# Patient Record
(demographics unavailable — no encounter records)

---

## 2017-07-13 NOTE — XRAY REPORT
ROUTINE CHEST, TWO VIEWS:



HISTORY:  Shortness of breath, COPD.



The trachea, heart, mediastinal contour, lung fields and bony thorax 

are unremarkable.



IMPRESSION:

Unremarkable chest x-ray.

## 2018-01-15 NOTE — EMERGENCY DEPARTMENT REPORT
ED General Adult HPI





- General


Chief complaint: Dyspnea/Respdistress


Stated complaint: MARYJANE


Time Seen by Provider: 01/15/18 07:05


Source: patient


Mode of arrival: Stretcher


Limitations: No Limitations





- History of Present Illness


Initial comments: 





This is a patient with COPD and exacerbation of the last day.  She states he's 

had a scant amount of yellow sputum and feels as if she had a cold.  She denies 

chest pain.  She denies leg swelling.  She's had no fever or chills.  She 

denies abdominal pain.  She's had no recent travel.  She denies any history of 

cardiovascular disease.





The patient was given magnesium albuterol nebs and Solu-Medrol in the field.  

She arrives in the emergency department with persistent wheezing but is not in 

respiratory distress.


-: Gradual, hour(s)


Severity scale (0 -10): 0


Associated Symptoms: denies other symptoms, shortness of breath (wheezing)


Treatments Prior to Arrival: none





- Related Data


 Home Medications











 Medication  Instructions  Recorded  Confirmed  Last Taken


 


ALBUTEROL Inhaler 2 puff INHALATION BID 10/25/17 10/26/17 10/25/17


 


Advair 100-50 Diskus 2 puff INHALATION BID 10/25/17 10/26/17 10/26/17


 


Lisinopril 20 mg PO DAILY 10/25/17 10/26/17 10/25/17


 


Montelukast 10 mg PO DAILY 10/25/17 10/26/17 10/25/17


 


amLODIPine 5 mg PO DAILY 10/25/17 10/26/17 10/25/17








 Previous Rx's











 Medication  Instructions  Recorded  Last Taken  Type


 


predniSONE [Deltasone] 50 mg PO QDAY #5 tab 04/19/17 Unknown Rx


 


ALBUTEROL Inhaler [ProAir HFA 2 puff IH QID PRN #1 inhalation 01/15/18 Unknown 

Rx





Inhaler]    


 


ALBUTEROL NEB's [Proventil 0.083% 2.5 mg IH TID PRN #90 neb 01/15/18 Unknown Rx





NEBS]    


 


Mometasone Furoate [Asmanex] 1 inhalation IH QPM #1 inhalation 01/15/18 Unknown 

Rx


 


predniSONE [Deltasone] 60 mg PO QDAY #20 tab 01/15/18 Unknown Rx











 Allergies











Allergy/AdvReac Type Severity Reaction Status Date / Time


 


No Known Allergies Allergy   Verified 11/07/16 09:43














ED Review of Systems


ROS: 


Stated complaint: MARYJANE


Other details as noted in HPI





Constitutional: denies: chills, fever


Eyes: denies: eye pain, eye discharge, vision change


ENT: denies: ear pain, throat pain


Respiratory: cough, shortness of breath, wheezing


Cardiovascular: denies: chest pain, palpitations


Endocrine: no symptoms reported


Gastrointestinal: denies: abdominal pain, nausea, diarrhea


Genitourinary: denies: urgency, dysuria, discharge


Musculoskeletal: denies: back pain, joint swelling, arthralgia


Skin: denies: rash, lesions


Neurological: denies: headache, weakness, paresthesias


Psychiatric: denies: anxiety, depression


Hematological/Lymphatic: denies: easy bleeding, easy bruising





ED Past Medical Hx





- Past Medical History


Previous Medical History?: Yes


Hx Hypertension: Yes


Hx Asthma: Yes (occasional steroid use)


Hx COPD: Yes





- Surgical History


Past Surgical History?: No





- Social History


Smoking Status: Never Smoker


Substance Use Type: None





- Medications


Home Medications: 


 Home Medications











 Medication  Instructions  Recorded  Confirmed  Last Taken  Type


 


predniSONE [Deltasone] 50 mg PO QDAY #5 tab 04/19/17 10/26/17 Unknown Rx


 


ALBUTEROL Inhaler 2 puff INHALATION BID 10/25/17 10/26/17 10/25/17 History


 


Advair 100-50 Diskus 2 puff INHALATION BID 10/25/17 10/26/17 10/26/17 History


 


Lisinopril 20 mg PO DAILY 10/25/17 10/26/17 10/25/17 History


 


Montelukast 10 mg PO DAILY 10/25/17 10/26/17 10/25/17 History


 


amLODIPine 5 mg PO DAILY 10/25/17 10/26/17 10/25/17 History


 


ALBUTEROL Inhaler [ProAir HFA 2 puff IH QID PRN #1 inhalation 01/15/18  Unknown 

Rx





Inhaler]     


 


ALBUTEROL NEB's [Proventil 0.083% 2.5 mg IH TID PRN #90 neb 01/15/18  Unknown Rx





NEBS]     


 


Mometasone Furoate [Asmanex] 1 inhalation IH QPM #1 inhalation 01/15/18  

Unknown Rx


 


predniSONE [Deltasone] 60 mg PO QDAY #20 tab 01/15/18  Unknown Rx














ED Physical Exam





- General


Limitations: No Limitations


General appearance: alert, in no apparent distress





- Head


Head exam: Present: atraumatic, normocephalic





- Eye


Eye exam: Present: normal appearance.  Absent: scleral icterus





- ENT


ENT exam: Present: mucous membranes moist





- Neck


Neck exam: Present: normal inspection





- Respiratory


Respiratory exam: Present: wheezes.  Absent: normal lung sounds bilaterally, 

respiratory distress, accessory muscle use





- Cardiovascular


Cardiovascular Exam: Present: regular rate, normal rhythm.  Absent: systolic 

murmur, diastolic murmur, rubs, gallop





- GI/Abdominal


GI/Abdominal exam: Present: soft, normal bowel sounds.  Absent: distended, 

tenderness, guarding, rebound, rigid





- Extremities Exam


Extremities exam: Present: normal inspection





- Back Exam


Back exam: Present: normal inspection





- Neurological Exam


Neurological exam: Present: alert, oriented X3, CN II-XII intact.  Absent: 

motor sensory deficit





- Psychiatric


Psychiatric exam: Present: normal affect, normal mood





- Skin


Skin exam: Present: warm, dry, intact, normal color.  Absent: rash





ED Course


 Vital Signs











  01/15/18 01/15/18 01/15/18





  06:52 07:37 07:43


 


Temperature 97.7 F  


 


Pulse Rate 106 H  


 


Pulse Rate [   78





Anterior   





Bilateral   





Throughout]   


 


Respiratory 22  





Rate   


 


Respiratory   20





Rate [Anterior   





Bilateral   





Throughout]   


 


Blood Pressure 170/96  


 


Blood Pressure 170/96  





[Left]   


 


O2 Sat by Pulse 100 100 





Oximetry   














  01/15/18





  08:19


 


Temperature 


 


Pulse Rate 


 


Pulse Rate [ 95 H





Anterior 





Bilateral 





Throughout] 


 


Respiratory 





Rate 


 


Respiratory 20





Rate [Anterior 





Bilateral 





Throughout] 


 


Blood Pressure 


 


Blood Pressure 





[Left] 


 


O2 Sat by Pulse 





Oximetry 














- Reevaluation(s)


Reevaluation #1: 


Wheezing improved.  Patient states that she feels well enough to go home.  

Awaiting lab tests.


01/15/18 08:33








ED Medical Decision Making





- Lab Data


Result diagrams: 


 01/15/18 07:22





 01/15/18 07:22








 Laboratory Results - last 24 hr











  01/15/18 01/15/18





  07:22 07:22


 


WBC  8.5 


 


RBC  4.38 


 


Hgb  13.1 


 


Hct  40.8 


 


MCV  93 


 


MCH  30 


 


MCHC  32 


 


RDW  13.5 


 


Plt Count  252 


 


Lymph % (Auto)  36.8 H 


 


Mono % (Auto)  7.4 H 


 


Eos % (Auto)  5.7 H 


 


Baso % (Auto)  0.7 


 


Lymph #  3.1 


 


Mono #  0.6 


 


Eos #  0.5 H 


 


Baso #  0.1 


 


Seg Neutrophils %  49.4 


 


Seg Neutrophils #  4.2 


 


Sodium   142


 


Potassium   3.0 L


 


Chloride   101.3


 


Carbon Dioxide   25


 


Anion Gap   19


 


BUN   14


 


Creatinine   0.6 L


 


Estimated GFR   > 60


 


BUN/Creatinine Ratio   23


 


Glucose   130 H


 


Calcium   8.8














 Laboratory Results - last 24 hr











  01/15/18 01/15/18





  07:22 07:22


 


WBC  8.5 


 


RBC  4.38 


 


Hgb  13.1 


 


Hct  40.8 


 


MCV  93 


 


MCH  30 


 


MCHC  32 


 


RDW  13.5 


 


Plt Count  252 


 


Lymph % (Auto)  36.8 H 


 


Mono % (Auto)  7.4 H 


 


Eos % (Auto)  5.7 H 


 


Baso % (Auto)  0.7 


 


Lymph #  3.1 


 


Mono #  0.6 


 


Eos #  0.5 H 


 


Baso #  0.1 


 


Seg Neutrophils %  49.4 


 


Seg Neutrophils #  4.2 


 


Sodium   142


 


Potassium   3.0 L


 


Chloride   101.3


 


Carbon Dioxide   25


 


Anion Gap   19


 


BUN   14


 


Creatinine   0.6 L


 


Estimated GFR   > 60


 


BUN/Creatinine Ratio   23


 


Glucose   130 H


 


Calcium   8.8














 Laboratory Results - last 24 hr











  01/15/18 01/15/18





  07:22 07:22


 


WBC  8.5 


 


RBC  4.38 


 


Hgb  13.1 


 


Hct  40.8 


 


MCV  93 


 


MCH  30 


 


MCHC  32 


 


RDW  13.5 


 


Plt Count  252 


 


Lymph % (Auto)  36.8 H 


 


Mono % (Auto)  7.4 H 


 


Eos % (Auto)  5.7 H 


 


Baso % (Auto)  0.7 


 


Lymph #  3.1 


 


Mono #  0.6 


 


Eos #  0.5 H 


 


Baso #  0.1 


 


Seg Neutrophils %  49.4 


 


Seg Neutrophils #  4.2 


 


Sodium   142


 


Potassium   3.0 L


 


Chloride   101.3


 


Carbon Dioxide   25


 


Anion Gap   19


 


BUN   14


 


Creatinine   0.6 L


 


Estimated GFR   > 60


 


BUN/Creatinine Ratio   23


 


Glucose   130 H


 


Calcium   8.8














 Laboratory Results - last 24 hr











  01/15/18 01/15/18 01/15/18





  07:22 07:22 09:40


 


WBC  8.5  


 


RBC  4.38  


 


Hgb  13.1  


 


Hct  40.8  


 


MCV  93  


 


MCH  30  


 


MCHC  32  


 


RDW  13.5  


 


Plt Count  252  


 


Lymph % (Auto)  36.8 H  


 


Mono % (Auto)  7.4 H  


 


Eos % (Auto)  5.7 H  


 


Baso % (Auto)  0.7  


 


Lymph #  3.1  


 


Mono #  0.6  


 


Eos #  0.5 H  


 


Baso #  0.1  


 


Seg Neutrophils %  49.4  


 


Seg Neutrophils #  4.2  


 


PT    13.5


 


INR    0.98


 


APTT    29.2


 


Sodium   142 


 


Potassium   3.0 L 


 


Chloride   101.3 


 


Carbon Dioxide   25 


 


Anion Gap   19 


 


BUN   14 


 


Creatinine   0.6 L 


 


Estimated GFR   > 60 


 


BUN/Creatinine Ratio   23 


 


Glucose   130 H 


 


Calcium   8.8 


 


Total Bilirubin   


 


Direct Bilirubin   


 


AST   


 


ALT   


 


Alkaline Phosphatase   


 


Troponin T   


 


NT-Pro-B Natriuret Pep   


 


Total Protein   


 


Albumin   


 


Albumin/Globulin Ratio   














  01/15/18





  09:40


 


WBC 


 


RBC 


 


Hgb 


 


Hct 


 


MCV 


 


MCH 


 


MCHC 


 


RDW 


 


Plt Count 


 


Lymph % (Auto) 


 


Mono % (Auto) 


 


Eos % (Auto) 


 


Baso % (Auto) 


 


Lymph # 


 


Mono # 


 


Eos # 


 


Baso # 


 


Seg Neutrophils % 


 


Seg Neutrophils # 


 


PT 


 


INR 


 


APTT 


 


Sodium 


 


Potassium 


 


Chloride 


 


Carbon Dioxide 


 


Anion Gap 


 


BUN 


 


Creatinine 


 


Estimated GFR 


 


BUN/Creatinine Ratio 


 


Glucose 


 


Calcium 


 


Total Bilirubin  0.40


 


Direct Bilirubin  < 0.2


 


AST  19


 


ALT  15


 


Alkaline Phosphatase  93


 


Troponin T  < 0.010


 


NT-Pro-B Natriuret Pep  42.30


 


Total Protein  7.6


 


Albumin  4.2


 


Albumin/Globulin Ratio  1.2














- EKG Data


-: EKG Interpreted by Me


EKG shows normal: sinus rhythm, intervals, QRS complexes, ST-T waves





- EKG Data


Interpretation: other (somewhat low voltage left axis deviation no ischemic 

changes)





- Radiology Data


Radiology results: report reviewed (chest x-ray shows no acute findings)


Critical care attestation.: 


If time is entered above; I have spent that time in minutes in the direct care 

of this critically ill patient, excluding procedure time.








ED Disposition


Clinical Impression: 


 COPD exacerbation, Hypokalemia





Disposition: DC-01 TO HOME OR SELFCARE


Is pt being admited?: No


Does the pt Need Aspirin: No


Condition: Stable


Instructions:  Chronic Obstructive Pulmonary Disease (ED), Hypokalemia (ED)


Additional Instructions: 


Follow-up with your primary care provider.  Rx prednisone, inhaler and 

albuterol for a urine machine as needed.  I'll also prescribed an inhaled 

steroid.  I'm giving name of a primary care provider and a lung specialist Dr. Shaikh.


Prescriptions: 


ALBUTEROL Inhaler [ProAir HFA Inhaler] 2 puff IH QID PRN #1 inhalation


 PRN Reason: Shortness Of Breath


ALBUTEROL NEB's [Proventil 0.083% NEBS] 2.5 mg IH TID PRN #90 neb


 PRN Reason: Wheezing


Mometasone Furoate [Asmanex] 1 inhalation IH QPM #1 inhalation


predniSONE [Deltasone] 60 mg PO QDAY #20 tab


Referrals: 


MINO BECKETT MD [Primary Care Provider] - 3-5 Days


DIANNE SHAIKH MD [Staff Physician] - 3-5 Days


Time of Disposition: 10:37

## 2018-01-15 NOTE — XRAY REPORT
FINAL REPORT



EXAM:  XR CHEST 1V AP



HISTORY:  Dyspnea 



TECHNIQUE:  AP portable view(s) of the chest obtained.



PRIORS:  None.



FINDINGS:  

No mediastinal shift. Cardiac silhouette is not enlarged.

Hyperaeration of the lungs and flattening of the hemidiaphragms.

No pneumothorax, effusion, or focal pulmonary opacity identified.

No acute skeletal findings. 



IMPRESSION:  

No acute pulmonary finding identified. 



Suggested sequela of COPD.

## 2018-03-11 NOTE — CAT SCAN REPORT
FINAL REPORT



PROCEDURE:  CT ANGIO CHEST



TECHNIQUE:  Computerized tomographic angiography of the chest was

performed after the IV injection of iodinated nonionic contrast

including image processing. The image data was postprocessed

using 2-dimensional multiplanar reformatted (MPR) and

3-dimensional (MIP and/or volume rendered) techniques. 



HISTORY:  decr pulses dispite strong US cardiac act 



COMPARISON:  No prior studies are available for comparison.



FINDINGS:  

Pulmonary outflow tract, right and left main pulmonary arteries

and their proximal branches: Clear, no filling defects seen to

suggest pulmonary embolus.



Pericardium: No evidence of pericardial effusion.



Thoracic aorta: No evidence of aneurysmal dilatation or

dissection.



Coronary arteries: Are unremarkable.



Mediastinum and hilar regions: Nonspecific subcentimeter lymph

nodes are visualized. No pathologically enlarged lymph nodes or

masses are identified. 



Lung Fields: There is dense consolidation in the right upper lobe

and also right middle lobe. Right upper lobe bronchus appears

occluded. This could represent mucous plugs or mass. There is

mild patchy peripheral density in the right lower lobe probably

representing atelectasis. There is minimal atelectasis on the

left. There is no pneumothorax or pleural effusion identified. 



Upper abdomen: No acute or focal abnormality is seeen.



Other: Endotracheal tube in place. The tip lies 2.4 centimeter

above the britt. Left jugular central venous line in place. The

tip projects in the mid innominate vein. 



IMPRESSION:  

No evidence of pulmonary embolus.



Dense consolidation right upper lobe right middle lobe and

obstruction of the bronchus may be due to mucous plugging or

aspiration. I cannot exclude a mass.



Small amount of atelectasis appears to be present in the right

lower lobe and left lung. No effusions or pneumothorax are

visualized. 



Endotracheal tube and left subclavian central venous line as

described. 



There is a nondisplaced fracture through the left 3rd and 4th

ribs anterior lateral there is a minimally displaced fracture

through the left 5th rib anterior lateral. Nondisplaced fracture

seen through the anterior lateral aspect right 2nd, right 4th rib

## 2018-03-11 NOTE — CAT SCAN REPORT
FINAL REPORT



PROCEDURE:  CT ANGIO ABDOMEN PELVIS



TECHNIQUE:  Computerized axial tomographic angiography of the

abdomen and pelvis was performed after the IV injection of

iodinated nonionic contrast. The image data was postprocessed

using 2-dimensional multiplanar reformatted (MPR) and

3-dimensional (MIP and/or volume rendered) techniques.



HISTORY:  decr pulses dispite strong US cardiac act 



COMPARISON:  No prior studies are available for comparison.



FINDINGS:  

Lower Lung fields: Dense consolidation visualized in the right

middle lobe. There is some patchy atelectasis visualized in the

right and left lower lobes.



Upper Abdomen: Gallbladder wall appears irregularly thickened in

there is some fluid collected in the gallbladder fossa. I cannot

exclude acute cholecystitis. Liver density appears mildly

decreased suggesting fatty infiltration. No discrete liver

lesions are identified. Pancreas showed no focal abnormality. The

spleen is not enlarged. No adrenal abnormalities are seen. Small

amount of fluid is collected adjacent to the inferior medial

aspect of the right lobe of the liver and adjacent to the

inferior vena cava.



Kidneys, Ureters and Urinary bladder: Stein catheter is seen in

the urinary bladder which is nearly empty. Kidneys are

unremarkable.



Retroperitoneum: There is no evidence of abdominal aortic

aneurysm occlusion or dissection. Celiac trunk and SMA are widely

patent. The inferior mesenteric artery also appears widely

patent. Single renal arteries are visualized bilaterally which

appear widely patent. There is minimal calcified plaquing at the

bifurcation of the left common iliac artery without significant

stenosis. The right and left iliac arteries otherwise appear

widely patent. 



Nonspecific subcentimeter lymph nodes are seen in the

retroperitoneum. No pathologically enlarged lymph nodes are

identified. 



Bowel: Small bowel loops proximally are fluid filled. The mucosal

pattern appears mildly prominent proximally. I cannot exclude a

mild nonspecific enteritis. I do not see evidence of bowel

obstruction. No free intraperitoneal gas is seen.



Reproductive organs: Uterus is enlarged and lobulated. There

appear to be multiple uterine masses measuring up to 6.1

centimeters consistent with multiple uterine fibroids. Abnormal

adnexal masses are seen.



Other: Mildly displaced fracture visualize through the anterior

lateral aspect of the left 6th rib. There also appears to be a

nondisplaced fracture through the anterior lateral aspect of the

left 5th rib. Nondisplaced fracture visualize through the

anterior lateral aspect of the right 4th rib.



IMPRESSION:  

Abdominal and pelvic vasculature appears intact.



Wall thickening and fluid in the gallbladder fossa suggest the

possibility of acute cholecystitis. Small amount of ascites is

visualized in the upper abdomen.



Mildly prominent mucosal folds small-bowel loops left upper

quadrant. Small bowel loops are also fluid filled suggesting a

mild nonspecific enteritis.



Enlarged uterus with multiple fibroids suspected. Pelvic

ultrasound could be obtained for further evaluation if clinically

indicated.



Mildly displaced acute rib fractures visualized bilaterally as

described above.

## 2018-03-11 NOTE — CAT SCAN REPORT
FINAL REPORT



PROCEDURE:  CT HEAD/BRAIN WO CON



TECHNIQUE:  Computerized tomography of the head was performed

without contrast material. 



HISTORY:  AMS 



COMPARISON:  No prior studies are available for comparison.



FINDINGS:  

Ventricles are normal size and are midline. No intracranial

hemorrhage is visualized. Gray-white matter junction is poorly

defined on this exam. The brain density appears homogeneous. I

cannot exclude a diffuse anoxic event. No abnormal extra-axial

fluid collections or masses are identified. 



There is mild bilateral maxillary sinus mucosal thickening. Small

air-fluid level present in the right maxillary sinus. There is

diffuse mucosal thickening in the ethmoid air cells. Mild mucosal

thickening seen in the sphenoid sinuses. Mild mucosal thickening

seen medially in both frontal sinuses. 



IMPRESSION:  

Abnormal homogeneous density of the brain as described with lack

of gray-white matter density differentiation. Findings are

worrisome for a diffuse anoxic injury. No hemorrhage is seen. No

mass effect is identified. 



Diffuse paranasal sinus disease as described. Air-fluid levels

suggest acute sinusitis. 



These findings were discussed in detail with Dr. Black on

03/11/2018 at 5:01 p.m.   Eastern standard time.

## 2018-03-11 NOTE — EMERGENCY DEPARTMENT REPORT
ED CPR HPI





- General


Chief Complaint: Cardiac Arrest/CPR


Stated Complaint: CARDIAC ARREST


Time Seen by Provider: 03/11/18 15:37


Source: EMS


Mode of arrival: Ambulatory


Limitations: No Limitations





- History of Present Illness


Initial Comments: 





Patient is a 62-year-old female past medical history of asthma COPD and 

hypertension who is presenting cardiac arrest.  Patient's family called 911 

because of diaphoresis and shortness of breath.  When EMS arrived they 

initiated ACLS protocol.  The patient was in PEA most of the ride to the 

emergency department.


MD Complaint: stopped breathing


-: minute(s) (30 prior to arrival)


Place: home


Initial Findings in the Field: agonal, PEA


ROSC in the Field: No


Associated Injuries: No


Associated Symptoms: shortness of breath


Treatments Prior to Arrival: chest compressions, epinephrine mgs # (1)





- Related Data


 Home Medications











 Medication  Instructions  Recorded  Confirmed  Last Taken


 


ALBUTEROL Inhaler 2 puff INHALATION BID 10/25/17 10/26/17 10/25/17


 


Advair 100-50 Diskus 2 puff INHALATION BID 10/25/17 10/26/17 10/26/17


 


Lisinopril 20 mg PO DAILY 10/25/17 10/26/17 10/25/17


 


Montelukast 10 mg PO DAILY 10/25/17 10/26/17 10/25/17


 


amLODIPine 5 mg PO DAILY 10/25/17 10/26/17 10/25/17








 Previous Rx's











 Medication  Instructions  Recorded  Last Taken  Type


 


predniSONE [Deltasone] 50 mg PO QDAY #5 tab 04/19/17 Unknown Rx


 


ALBUTEROL Inhaler [ProAir HFA 2 puff IH QID PRN #1 inhalation 01/15/18 Unknown 

Rx





Inhaler]    


 


ALBUTEROL NEB's [Proventil 0.083% 2.5 mg IH TID PRN #90 neb 01/15/18 Unknown Rx





NEBS]    


 


Mometasone Furoate [Asmanex] 1 inhalation IH QPM #1 inhalation 01/15/18 Unknown 

Rx


 


predniSONE [Deltasone] 60 mg PO QDAY #20 tab 01/15/18 Unknown Rx











 Allergies











Allergy/AdvReac Type Severity Reaction Status Date / Time


 


Unable to Assess Allergy   Unverified 03/11/18 15:04














ED Review of Systems


ROS: 


Stated complaint: CARDIAC ARREST


Other details as noted in HPI





Comment: Unobtainable due to pts medical conditions





ED Past Medical Hx





- Past Medical History


Previous Medical History?: Yes


Hx Hypertension: Yes


Hx Asthma: Yes (occasional steroid use)


Hx COPD: Yes





- Surgical History


Past Surgical History?: No





- Social History


Smoking Status: Unknown if ever smoked





- Medications


Home Medications: 


 Home Medications











 Medication  Instructions  Recorded  Confirmed  Last Taken  Type


 


predniSONE [Deltasone] 50 mg PO QDAY #5 tab 04/19/17 10/26/17 Unknown Rx


 


ALBUTEROL Inhaler 2 puff INHALATION BID 10/25/17 10/26/17 10/25/17 History


 


Advair 100-50 Diskus 2 puff INHALATION BID 10/25/17 10/26/17 10/26/17 History


 


Lisinopril 20 mg PO DAILY 10/25/17 10/26/17 10/25/17 History


 


Montelukast 10 mg PO DAILY 10/25/17 10/26/17 10/25/17 History


 


amLODIPine 5 mg PO DAILY 10/25/17 10/26/17 10/25/17 History


 


ALBUTEROL Inhaler [ProAir HFA 2 puff IH QID PRN #1 inhalation 01/15/18  Unknown 

Rx





Inhaler]     


 


ALBUTEROL NEB's [Proventil 0.083% 2.5 mg IH TID PRN #90 neb 01/15/18  Unknown Rx





NEBS]     


 


Mometasone Furoate [Asmanex] 1 inhalation IH QPM #1 inhalation 01/15/18  

Unknown Rx


 


predniSONE [Deltasone] 60 mg PO QDAY #20 tab 01/15/18  Unknown Rx














ED Physical Exam





- General


Limitations: No Limitations


General appearance: obtunded





- Head


Head exam: Present: atraumatic





- Eye


Eye exam: Present: other (pupils are poorly responsive but are not dilated at 

this time.)





- ENT


ENT exam: Present: other (small amount of blood in the posterior pharynx 

probably secondary to an attempt at intubation by EMS)





- Neck


Neck exam: Present: normal inspection





- Respiratory


Respiratory exam: Present: other (there are no spontaneous breath sounds.  

Lungs are coarse with bagging.)





- Cardiovascular


Cardiovascular Exam: Present: other (no spontaneous heart tones)





- GI/Abdominal


GI/Abdominal exam: Present: soft, distended





- Back Exam


Back exam: Present: normal inspection





- Skin


Skin exam: Present: warm, intact, normal color.  Absent: rash





ED Course





 Vital Signs











  03/11/18 03/11/18 03/11/18





  14:52 15:04 15:06


 


Pulse Rate 105 H 135 H 129 H


 


Respiratory  15 18





Rate   


 


Blood Pressure   99/61


 


Blood Pressure 121/88  





[Left]   


 


O2 Sat by Pulse 100 64 L 74 L





Oximetry   














  03/11/18 03/11/18 03/11/18





  15:10 15:20 15:25


 


Pulse Rate 99 H 126 H 101 H


 


Respiratory 18 19 14





Rate   


 


Blood Pressure 99/61 127/55 88/35


 


Blood Pressure   





[Left]   


 


O2 Sat by Pulse 47 L  91





Oximetry   














  03/11/18





  16:21


 


Pulse Rate 


 


Respiratory 24





Rate 


 


Blood Pressure 


 


Blood Pressure 





[Left] 


 


O2 Sat by Pulse 100





Oximetry 














- ABG Interpretation


Ph: 6.78


Interpretation: metabolic acidosis





- Central Line Placement


  ** Left IJ


Consent Obtained: emergent situation


Time Out Performed: Yes


Patient Placed on Monitor/Pulse Ox: Yes


MD Prep: gloves


Central Line Prep: Chlorhexidine scrub, sterile drapes applied


Ultrasound Used for Placement: Yes


Central Line Lumen Inserted: triple


Bloods Obtained for Lab: Yes


Central Line Position: good blood return, all ports aspirated, flus, sutured in 

place with 2-0


Dressing Applied: Tegaderm


Patient Tolerated Procedure: well





- Intubation


Time Out Performed: Yes


Laryngoscope: Anali


Size: 3


ET Tube Size: 7.5


Tube Secured Depth (cm): 22


Tube Secured Location: lips


Tube Placement Confirmation: visualized tube passing t, equal breath sounds 

bilat, no breath sounds over epi, confirmation by capnometr


Patient Tolerated Procedure: well


Intubation Complications: none





ED Medical Decision Making





- Lab Data


Result diagrams: 


 03/11/18 15:15





 03/11/18 15:15








 Lab Results











  03/11/18 03/11/18 03/11/18 Range/Units





  15:15 15:15 15:15 


 


Lymph % (Auto)  Np    


 


Mono % (Auto)  5.0    (0.0-7.3)  %


 


Eos % (Auto)  3.0    (0.0-4.3)  %


 


Lymph #  Np    


 


Baso #  0.0    (0.0-0.1)  K/mm3


 


Seg Neutrophils %  Np    


 


PT   18.2 H   (12.2-14.9)  Sec.


 


INR   1.42 H   (0.87-1.13)  


 


D-Dimer   > 70140 H   (0-234)  ng/mlDDU


 


POC ABG pH     (7.35-7.45)  


 


POC ABG pCO2     (35-45)  


 


POC ABG pO2     ()  


 


POC ABG HCO3     


 


POC ABG Total CO2     


 


POC ABG O2 Sat     


 


POC ABG Base Excess     


 


VBG pH     (7.320-7.420)  


 


FiO2     %


 


Sodium    139  (137-145)  mmol/L


 


Potassium    3.6  (3.6-5.0)  mmol/L


 


Chloride    95.7 L  ()  mmol/L


 


Carbon Dioxide    15 L  (22-30)  mmol/L


 


Anion Gap    32  mmol/L


 


BUN    15  (7-17)  mg/dL


 


Creatinine    1.2  (0.7-1.2)  mg/dL


 


Estimated GFR    55  ml/min


 


BUN/Creatinine Ratio    13  %


 


Glucose    480 H  ()  mg/dL


 


Lactic Acid     (0.7-2.0)  mmol/L


 


Calcium    7.9 L  (8.4-10.2)  mg/dL


 


Total Bilirubin    0.30  (0.1-1.2)  mg/dL


 


AST    55 H  (5-40)  units/L


 


ALT    72 H  (7-56)  units/L


 


Alkaline Phosphatase    85  ()  units/L


 


Total Creatine Kinase     ()  units/L


 


CK-MB (CK-2)     (0.0-4.0)  ng/mL


 


CK-MB (CK-2) Rel Index     (0-4)  


 


Troponin T     (0.00-0.029)  ng/mL


 


NT-Pro-B Natriuret Pep     (0-900)  pg/mL


 


Total Protein    5.6 L  (6.3-8.2)  g/dL


 


Albumin    3.3 L  (3.9-5)  g/dL


 


Albumin/Globulin Ratio    1.4  %


 


Urine Color     (Yellow)  


 


Urine Turbidity     (Clear)  


 


Urine pH     (5.0-7.0)  


 


Ur Specific Gravity     (1.003-1.030)  


 


Urine Protein     (Negative)  mg/dL


 


Urine Glucose (UA)     (Negative)  mg/dL


 


Urine Ketones     (Negative)  mg/dL


 


Urine Blood     (Negative)  


 


Urine Nitrite     (Negative)  


 


Urine Bilirubin     (Negative)  


 


Urine Urobilinogen     (<2.0)  mg/dL


 


Ur Leukocyte Esterase     (Negative)  


 


Urine WBC (Auto)     (0.0-6.0)  /HPF


 


Urine RBC (Auto)     (0.0-6.0)  /HPF


 


U Epithel Cells (Auto)     (0-13.0)  /HPF


 


Urine Mucus     /HPF














  03/11/18 03/11/18 03/11/18 Range/Units





  15:15 15:15 15:15 


 


Lymph % (Auto)     


 


Mono % (Auto)     (0.0-7.3)  %


 


Eos % (Auto)     (0.0-4.3)  %


 


Lymph #     


 


Baso #     (0.0-0.1)  K/mm3


 


Seg Neutrophils %     


 


PT     (12.2-14.9)  Sec.


 


INR     (0.87-1.13)  


 


D-Dimer     (0-234)  ng/mlDDU


 


POC ABG pH     (7.35-7.45)  


 


POC ABG pCO2     (35-45)  


 


POC ABG pO2     ()  


 


POC ABG HCO3     


 


POC ABG Total CO2     


 


POC ABG O2 Sat     


 


POC ABG Base Excess     


 


VBG pH   6.787 L*   (7.320-7.420)  


 


FiO2     %


 


Sodium     (137-145)  mmol/L


 


Potassium     (3.6-5.0)  mmol/L


 


Chloride     ()  mmol/L


 


Carbon Dioxide     (22-30)  mmol/L


 


Anion Gap     mmol/L


 


BUN     (7-17)  mg/dL


 


Creatinine     (0.7-1.2)  mg/dL


 


Estimated GFR     ml/min


 


BUN/Creatinine Ratio     %


 


Glucose     ()  mg/dL


 


Lactic Acid  13.90 H*    (0.7-2.0)  mmol/L


 


Calcium     (8.4-10.2)  mg/dL


 


Total Bilirubin     (0.1-1.2)  mg/dL


 


AST     (5-40)  units/L


 


ALT     (7-56)  units/L


 


Alkaline Phosphatase     ()  units/L


 


Total Creatine Kinase    170 H  ()  units/L


 


CK-MB (CK-2)    1.9  (0.0-4.0)  ng/mL


 


CK-MB (CK-2) Rel Index    1.1  (0-4)  


 


Troponin T    < 0.010  (0.00-0.029)  ng/mL


 


NT-Pro-B Natriuret Pep    < 5  (0-900)  pg/mL


 


Total Protein     (6.3-8.2)  g/dL


 


Albumin     (3.9-5)  g/dL


 


Albumin/Globulin Ratio     %


 


Urine Color     (Yellow)  


 


Urine Turbidity     (Clear)  


 


Urine pH     (5.0-7.0)  


 


Ur Specific Gravity     (1.003-1.030)  


 


Urine Protein     (Negative)  mg/dL


 


Urine Glucose (UA)     (Negative)  mg/dL


 


Urine Ketones     (Negative)  mg/dL


 


Urine Blood     (Negative)  


 


Urine Nitrite     (Negative)  


 


Urine Bilirubin     (Negative)  


 


Urine Urobilinogen     (<2.0)  mg/dL


 


Ur Leukocyte Esterase     (Negative)  


 


Urine WBC (Auto)     (0.0-6.0)  /HPF


 


Urine RBC (Auto)     (0.0-6.0)  /HPF


 


U Epithel Cells (Auto)     (0-13.0)  /HPF


 


Urine Mucus     /HPF














  03/11/18 03/11/18 Range/Units





  15:49 16:22 


 


Lymph % (Auto)    


 


Mono % (Auto)    (0.0-7.3)  %


 


Eos % (Auto)    (0.0-4.3)  %


 


Lymph #    


 


Baso #    (0.0-0.1)  K/mm3


 


Seg Neutrophils %    


 


PT    (12.2-14.9)  Sec.


 


INR    (0.87-1.13)  


 


D-Dimer    (0-234)  ng/mlDDU


 


POC ABG pH   7.161 L  (7.35-7.45)  


 


POC ABG pCO2   73.3 H  (35-45)  


 


POC ABG pO2   243 H  ()  


 


POC ABG HCO3   26.2  


 


POC ABG Total CO2   28  


 


POC ABG O2 Sat   100  


 


POC ABG Base Excess   -2  


 


VBG pH    (7.320-7.420)  


 


FiO2   100  %


 


Sodium    (137-145)  mmol/L


 


Potassium    (3.6-5.0)  mmol/L


 


Chloride    ()  mmol/L


 


Carbon Dioxide    (22-30)  mmol/L


 


Anion Gap    mmol/L


 


BUN    (7-17)  mg/dL


 


Creatinine    (0.7-1.2)  mg/dL


 


Estimated GFR    ml/min


 


BUN/Creatinine Ratio    %


 


Glucose    ()  mg/dL


 


Lactic Acid    (0.7-2.0)  mmol/L


 


Calcium    (8.4-10.2)  mg/dL


 


Total Bilirubin    (0.1-1.2)  mg/dL


 


AST    (5-40)  units/L


 


ALT    (7-56)  units/L


 


Alkaline Phosphatase    ()  units/L


 


Total Creatine Kinase    ()  units/L


 


CK-MB (CK-2)    (0.0-4.0)  ng/mL


 


CK-MB (CK-2) Rel Index    (0-4)  


 


Troponin T    (0.00-0.029)  ng/mL


 


NT-Pro-B Natriuret Pep    (0-900)  pg/mL


 


Total Protein    (6.3-8.2)  g/dL


 


Albumin    (3.9-5)  g/dL


 


Albumin/Globulin Ratio    %


 


Urine Color  Yellow   (Yellow)  


 


Urine Turbidity  Clear   (Clear)  


 


Urine pH  6.0   (5.0-7.0)  


 


Ur Specific Gravity  1.021   (1.003-1.030)  


 


Urine Protein  <15 mg/dl   (Negative)  mg/dL


 


Urine Glucose (UA)  Neg   (Negative)  mg/dL


 


Urine Ketones  Neg   (Negative)  mg/dL


 


Urine Blood  Neg   (Negative)  


 


Urine Nitrite  Neg   (Negative)  


 


Urine Bilirubin  Neg   (Negative)  


 


Urine Urobilinogen  4.0   (<2.0)  mg/dL


 


Ur Leukocyte Esterase  Neg   (Negative)  


 


Urine WBC (Auto)  1.0   (0.0-6.0)  /HPF


 


Urine RBC (Auto)  1.0   (0.0-6.0)  /HPF


 


U Epithel Cells (Auto)  < 1.0   (0-13.0)  /HPF


 


Urine Mucus  Few   /HPF














- EKG Data


-: EKG Interpreted by Me





- EKG Data


Interpretation: other (initial EKG during the first period of return of 

spontaneous circulation showed A. fib at a rate of 75 there were normal axis 

normal intervals and ST depression inferior lateral leads is no ST elevation, 

interpretation 1459.  Second EKG at 1526 shows same pattern.)





- Medical Decision Making





Patient is a 62-year-old athletic female who is presenting in cardiac arrest.  

Patient was not intubated on arrival.  I intubated her with a 7.5 endotracheal 

tube with no complications.  The patient also underwent traditional ACLS 

protocols.  Please see code sheet on nursing documentation.  Patient on 3 

different occasions did regain spontaneous circulation but would then lose her 

pulse.  During the last episode of pulseless activity the patient was noted to 

be in sinus tachycardia ultrasound was used to see very strong cardiac 

activity.  Pulses were still faint.  Patient was started on Aleve drip and 

pulses were again palpable.  With a drip before patient with the CT was added 

30 mics.  Patient was taken to CT to rule out PE and aortic dissection.  These 

tests are pending at this time.  Patient will be admitted to the ICU at this 

time by Dr. Mathis.  Patient admitted in critical status.


Critical Care Time: Yes


Critical care time in (mins) excluding proc time.: 75


Critical care attestation.: 


If time is entered above; I have spent that time in minutes in the direct care 

of this critically ill patient, excluding procedure time.








ED Disposition


Clinical Impression: 


 Cardiac arrest





Disposition: [id number] OP ADMIT IP TO THIS HOSP


Is pt being admited?: Yes


Does the pt Need Aspirin: No


Condition: Critical


Referrals: 


PRIMARY CARE,MD [Primary Care Provider] - 3-5 Days

## 2018-03-11 NOTE — HISTORY AND PHYSICAL REPORT
History of Present Illness


Date of admission: 


03/11/18 16:47





Chief complaint: 





Unresponsive


History of present illness: 


63 YO female with HTN, COPD, Asthma presents to ED for evaluation. Pt unable to 

provide history. Pt history taken from ED staff, EMS, and medical record. As 

per family, the patient experienced shortness of breath and diaphoresis this 

morning. EMS was notified, and upon arrival the patient was found to be in 

respiratory distress, and subsequently became unresponsive. Pt treated [facility] ACLS 

protocol, and transported to [facility] for evaluation. Pt found to be in PEA. Pt seen 

and evaluated in ED and found to be in extremis. Pt intubated and placed on 

vent support. Pt found to have RUL pneumonia consistent with Aspiration 

pneumonia. Pt admitted to  ICU. CT head consistent with DOLORES. Pulmonary 

consulted in ED. 





Past History


Past Medical History: COPD, hypertension


Past Surgical History: No surgical history, Other (reviewed)


Social history: single.  denies: smoking, alcohol abuse, prescription drug abuse


Family history: hypertension





Medications and Allergies


 Allergies











Allergy/AdvReac Type Severity Reaction Status Date / Time


 


No Known Allergies Allergy   Unverified 03/11/18 17:14











 Home Medications











 Medication  Instructions  Recorded  Confirmed  Last Taken  Type


 


predniSONE [Deltasone] 50 mg PO QDAY #5 tab 04/19/17 10/26/17 Unknown Rx


 


ALBUTEROL Inhaler 2 puff INHALATION BID 10/25/17 10/26/17 10/25/17 History


 


Advair 100-50 Diskus 2 puff INHALATION BID 10/25/17 10/26/17 10/26/17 History


 


Lisinopril 20 mg PO DAILY 10/25/17 10/26/17 10/25/17 History


 


Montelukast 10 mg PO DAILY 10/25/17 10/26/17 10/25/17 History


 


amLODIPine 5 mg PO DAILY 10/25/17 10/26/17 10/25/17 History


 


ALBUTEROL Inhaler [ProAir HFA 2 puff IH QID PRN #1 inhalation 01/15/18  Unknown 

Rx





Inhaler]     


 


ALBUTEROL NEB's [Proventil 0.083% 2.5 mg IH TID PRN #90 neb 01/15/18  Unknown Rx





NEBS]     


 


Mometasone Furoate [Asmanex] 1 inhalation IH QPM #1 inhalation 01/15/18  

Unknown Rx


 


predniSONE [Deltasone] 60 mg PO QDAY #20 tab 01/15/18  Unknown Rx











Active Meds: 


Active Medications





Albuterol (Proventil)  2.5 mg IH Q3HRT PRN


   PRN Reason: Shortness Of Breath


Hydrophilic Ointment (Vaseline Lip Therapy)  1 applic TP Q2HR PRN


   PRN Reason: Dry Lips


Norepinephrine (Levophed Drip 4 Mg/Ns 250 Ml)  4 mg in 250 mls @ 7.5 mls/hr IV 

TITR ANNY; Protocol


   Last Titration: 03/11/18 17:37 Dose:  2.5 mcg/min, 9.375 mls/hr


Multi-Ingred Cream/Lotion/Oil/Oint (Artificial Tears Ophth Oint)  1 applic OU 

Q4HR PRN


   PRN Reason: Dry Eye(s)


Sodium Chloride (Nacl 0.9% 500 Ml)  1 ml IV AS DIRECT ANNY


Sodium Chloride (Sodium Chloride Flush Syringe 10 Ml)  10 ml IV BID ANNY


Sodium Chloride (Sodium Chloride Flush Syringe 10 Ml)  10 ml IV PRN PRN


   PRN Reason: LINE FLUSH











Review of Systems


ROS unobtainable: due to mental status





Exam





- Constitutional


Vitals: 


 











Temp Pulse Resp BP Pulse Ox


 


 95.2 F L  122 H  24   136/84   100 


 


 03/11/18 16:44  03/11/18 17:30  03/11/18 17:30  03/11/18 17:30  03/11/18 17:30











General appearance: Present: severe distress





- EENT


Eyes: Present: mydriasis





- Neck


Neck: Present: supple, normal ROM





- Respiratory


Respiratory effort: labored


Respiratory: bilateral: diminished, rhonchi





- Cardiovascular


Heart Sounds: Present: S1 & S2.  Absent: rub, click





- Extremities


Extremities: pulses symmetrical, No edema


Peripheral Pulses: within normal limits





- Abdominal


General gastrointestinal: Present: soft, non-tender, non-distended, normal 

bowel sounds


Female genitourinary: Present: normal





- Integumentary


Integumentary: Present: clear, warm, dry





- Musculoskeletal


Musculoskeletal: generalized weakness





- Psychiatric


Psychiatric: no intact judgment & insight, no memory intact





- Neurologic


Neurologic: no CNII-XII intact, no moves all extremities, no gait normal





Results





- Labs


CBC & Chem 7: 


 03/11/18 15:15





 03/11/18 15:15


Labs: 


 Abnormal lab results











  03/11/18 03/11/18 03/11/18 Range/Units





  15:15 15:15 15:15 


 


MCV  100 H    (79-97)  fl


 


Seg Neuts % (Manual)  34.0 L    (40.0-70.0)  %


 


Lymphocytes % (Manual)  59.0 H    (13.4-35.0)  %


 


Lymphocytes # (Manual)  6.0 H    (1.2-5.4)  K/mm3


 


PT   18.2 H   (12.2-14.9)  Sec.


 


INR   1.42 H   (0.87-1.13)  


 


D-Dimer   > 51143 H   (0-234)  ng/mlDDU


 


POC ABG pH     (7.35-7.45)  


 


POC ABG pCO2     (35-45)  


 


POC ABG pO2     ()  


 


VBG pH     (7.320-7.420)  


 


Chloride    95.7 L  ()  mmol/L


 


Carbon Dioxide    15 L  (22-30)  mmol/L


 


Glucose    480 H  ()  mg/dL


 


Lactic Acid     (0.7-2.0)  mmol/L


 


Calcium    7.9 L  (8.4-10.2)  mg/dL


 


AST    55 H  (5-40)  units/L


 


ALT    72 H  (7-56)  units/L


 


Total Creatine Kinase     ()  units/L


 


Total Protein    5.6 L  (6.3-8.2)  g/dL


 


Albumin    3.3 L  (3.9-5)  g/dL














  03/11/18 03/11/18 03/11/18 Range/Units





  15:15 15:15 15:15 


 


MCV     (79-97)  fl


 


Seg Neuts % (Manual)     (40.0-70.0)  %


 


Lymphocytes % (Manual)     (13.4-35.0)  %


 


Lymphocytes # (Manual)     (1.2-5.4)  K/mm3


 


PT     (12.2-14.9)  Sec.


 


INR     (0.87-1.13)  


 


D-Dimer     (0-234)  ng/mlDDU


 


POC ABG pH     (7.35-7.45)  


 


POC ABG pCO2     (35-45)  


 


POC ABG pO2     ()  


 


VBG pH   6.787 L*   (7.320-7.420)  


 


Chloride     ()  mmol/L


 


Carbon Dioxide     (22-30)  mmol/L


 


Glucose     ()  mg/dL


 


Lactic Acid  13.90 H*    (0.7-2.0)  mmol/L


 


Calcium     (8.4-10.2)  mg/dL


 


AST     (5-40)  units/L


 


ALT     (7-56)  units/L


 


Total Creatine Kinase    170 H  ()  units/L


 


Total Protein     (6.3-8.2)  g/dL


 


Albumin     (3.9-5)  g/dL














  03/11/18 03/11/18 Range/Units





  16:21 16:22 


 


MCV    (79-97)  fl


 


Seg Neuts % (Manual)    (40.0-70.0)  %


 


Lymphocytes % (Manual)    (13.4-35.0)  %


 


Lymphocytes # (Manual)    (1.2-5.4)  K/mm3


 


PT    (12.2-14.9)  Sec.


 


INR    (0.87-1.13)  


 


D-Dimer    (0-234)  ng/mlDDU


 


POC ABG pH   7.161 L  (7.35-7.45)  


 


POC ABG pCO2   73.3 H  (35-45)  


 


POC ABG pO2   243 H  ()  


 


VBG pH    (7.320-7.420)  


 


Chloride    ()  mmol/L


 


Carbon Dioxide    (22-30)  mmol/L


 


Glucose    ()  mg/dL


 


Lactic Acid  8.30 H*   (0.7-2.0)  mmol/L


 


Calcium    (8.4-10.2)  mg/dL


 


AST    (5-40)  units/L


 


ALT    (7-56)  units/L


 


Total Creatine Kinase    ()  units/L


 


Total Protein    (6.3-8.2)  g/dL


 


Albumin    (3.9-5)  g/dL














Assessment and Plan





- Patient Problems


(1) Aspiration pneumonia


Current Visit: Yes   Status: Acute   


Qualifiers: 


   Laterality: right   Lung location: upper lobe of lung 


Plan to address problem: 


IV antibiotics, supplemental oxygen, blood cultures, urinalysis, chest X ray, 

CT chest, 








(2) Respiratory failure with hypoxia


Current Visit: Yes   Status: Acute   


Qualifiers: 


   Chronicity: acute   Qualified Code(s): J96.01 - Acute respiratory failure 

with hypoxia   


Plan to address problem: 


Pt intubated, sedated on vent support, nebulizer therapy, supplemental oxygen, 

wean vent as tolerated, SBT daily, sedation holiday, daily ABG, Poor prognosis, 

Pulmonary consulted. 





The high probability of a clinically significant, sudden or life threatening 

deterioration of the [pulmonary, cardiac, renal] system(s) required my full and 

direct attention, intervention and personal management. The aggregate critical 

care time was [65] minutes. This time is in addition to time spent performing 

reported procedures but includes the following: 





[x] Data Review and interpretation 





[x] Patient assessment and monitoring of vital signs 





[x] Documentation 





[x] Medication orders and management








(3) Metabolic acidosis


Current Visit: Yes   Status: Acute   


Plan to address problem: 


IVF resuscitation, serial bmp, monitor uop q shift, 








(4) Anoxic brain damage


Current Visit: Yes   Status: Acute   


Plan to address problem: 


CT Head, neuro checks, 








(5) Cardiac arrest


Current Visit: Yes   Status: Acute   


Plan to address problem: 


Pt treated IAW ACLS protocol with return of perfusing rhythm, IV pressor 

support. 








(6) Cardiogenic shock


Current Visit: Yes   Status: Acute   


Plan to address problem: 


IV pressor support, IVF resuscitation, supportive care. 








(7) DVT prophylaxis


Current Visit: Yes   Status: Acute

## 2018-03-11 NOTE — XRAY REPORT
FINAL REPORT



PROCEDURE:  Portable supine AP chest x-ray 



TECHNIQUE:  Chest radiograph portable supine AP view. CPT 73147







HISTORY:  possible pneumothorax 



COMPARISON:  No prior studies are available for comparison.



FINDINGS:  

Endotracheal tube in place lying 2.1 centimeter above the britt

and in good position. Left jugular central venous line in place.

Tip of the catheter projects near the junction of the innominate

vein and superior vena cava. No pneumothorax visualized. Lungs

are clear. No infiltrates masses or effusions are seen. Heart

size and pulmonary vasculature appear normal. Rib fracture seen

on the CT scan performed earlier are not visualized on these

plain films. Dense consolidation seen on the CT scan in the right

middle lobe and right upper lobe are not visualized and presumed

re-aerated. 



IMPRESSION:  

Endotracheal tube and central venous line as described. No

evidence of pneumothorax. Lungs appear clear without infiltrates

masses effusions or pneumothorax. 



Rib fractures visualized on the CT scan of the chest earlier are

not visualized on this plain film.

## 2018-03-12 NOTE — HISTORY AND PHYSICAL REPORT
History of Present Illness


Date of examination: 03/12/18


Date of admission: 


03/11/18 16:47





Chief complaint: 


Anoxic brain injury


History of present illness: 


62 left handed  female with a past medical history of HTN, COPD

, Asthma presents to ED for evaluation. As per family, the patient experienced 

shortness of breath and diaphoresis yesterday morning. EMS was notified, and 

upon arrival the patient was found to be in respiratory distress, and 

subsequently became unresponsive. She was treated I[facility] ACLS protocol, and 

transported to [facility] for evaluation. She apparently suffered from cardiac arrest. 

She was coded 3 times. She was intubated and placed on vent support. Pt found 

to have RUL pneumonia consistent with Aspiration pneumonia. There was no 

clinical seizure like activities noted. She can't give details. The HPI was 

obtained per medical staff, medical recording, her son, daughter in law and 

cousin at bed side. 





Past History


Past Medical History: COPD, hypertension


Past Surgical History: No surgical history, Other (reviewed)


Social history: single.  denies: smoking, alcohol abuse, prescription drug abuse


Family history: hypertension





Medications and Allergies


 Allergies











Allergy/AdvReac Type Severity Reaction Status Date / Time


 


No Known Allergies Allergy   Unverified 03/11/18 17:14











 Home Medications











 Medication  Instructions  Recorded  Confirmed  Last Taken  Type


 


predniSONE [Deltasone] 50 mg PO QDAY #5 tab 04/19/17 10/26/17 Unknown Rx


 


ALBUTEROL Inhaler 2 puff INHALATION BID 10/25/17 10/26/17 10/25/17 History


 


Advair 100-50 Diskus 2 puff INHALATION BID 10/25/17 10/26/17 10/26/17 History


 


Lisinopril 20 mg PO DAILY 10/25/17 10/26/17 10/25/17 History


 


Montelukast 10 mg PO DAILY 10/25/17 10/26/17 10/25/17 History


 


amLODIPine 5 mg PO DAILY 10/25/17 10/26/17 10/25/17 History


 


ALBUTEROL Inhaler [ProAir HFA 2 puff IH QID PRN #1 inhalation 01/15/18  Unknown 

Rx





Inhaler]     


 


ALBUTEROL NEB's [Proventil 0.083% 2.5 mg IH TID PRN #90 neb 01/15/18  Unknown Rx





NEBS]     


 


Mometasone Furoate [Asmanex] 1 inhalation IH QPM #1 inhalation 01/15/18  

Unknown Rx


 


predniSONE [Deltasone] 60 mg PO QDAY #20 tab 01/15/18  Unknown Rx











Active Meds: 


Active Medications





Albuterol (Proventil)  2.5 mg IH Q3HRT PRN


   PRN Reason: Shortness Of Breath


Albuterol/Ipratropium (Duoneb *Not For Prn Use*)  1 ampul IH Q4HRT ANNY


   Last Admin: 03/12/18 12:34 Dose:  1 ampul


Lipase/Protease/Amylase (Pancreaze Dr 10,500 Unit)  1 each FEEDTUBE PRN PRN


   PRN Reason: For Clogged Feeding Tube


Famotidine (Pepcid)  20 mg IV BID ANNY


   Last Admin: 03/12/18 11:52 Dose:  Not Given


Hydrophilic Ointment (Vaseline Lip Therapy)  1 applic TP Q2HR PRN


   PRN Reason: Dry Lips


Ampicillin Sodium/Sulbactam Sodium (Unasyn/Ns 3 Gm/100 Ml)  3 gm in 100 mls @ 

100 mls/hr IV Q6HR ANNY; Protocol


   Last Admin: 03/12/18 11:52 Dose:  100 mls/hr


Vasopressin 20 unit/ Sodium (Chloride)  101 mls @ 9.09 mls/hr IV TITR ANNY; 

Protocol


   Last Admin: 03/12/18 07:52 Dose:  0.03 units/min, 9.09 mls/hr


Norepinephrine (Levophed Drip 4 Mg/Ns 250 Ml)  4 mg in 250 mls @ 7.5 mls/hr IV 

TITR ANNY; Protocol


   Last Titration: 03/12/18 09:48 Dose:  0 mcg/min, 0 mls/hr


Dextrose/Sodium Chloride (D5/0.45ns)  1,000 mls @ 100 mls/hr IV AS DIRECT ANNY


Multi-Ingred Cream/Lotion/Oil/Oint (Artificial Tears Ophth Oint)  1 applic OU 

Q4HR PRN


   PRN Reason: Dry Eye(s)


Simple Syrup (Simple Syrup)  15 ml FEEDTUBE PRN PRN


   PRN Reason: Hypoglycemia


Simple Syrup (Simple Syrup)  30 ml FEEDTUBE PRN PRN


   PRN Reason: Hypoglycemia


Sodium Bicarbonate (Sodium Bicarbonate)  325 mg FEEDTUBE PRN PRN


   PRN Reason: For Clogged Feeding Tube


Sodium Chloride (Nacl 0.9% 500 Ml)  1 ml IV AS DIRECT ANNY


Sodium Chloride (Sodium Chloride Flush Syringe 10 Ml)  10 ml IV BID ANNY


   Last Admin: 03/12/18 11:53 Dose:  10 ml


Sodium Chloride (Sodium Chloride Flush Syringe 10 Ml)  10 ml IV PRN PRN


   PRN Reason: LINE FLUSH











Review of Systems


ROS unobtainable: due to endotracheal tube





Physical Examination





- Vital Signs


Vital Signs: 


 Vital Signs











Pulse Ox


 


 100 


 


 03/11/18 14:50














- Constitutional


General appearance: acutely ill





- EENT


EENT: Present: mucous membranes moist





- Respiratory


Respiratory: Present: lungs clear





- Cardiovascular


Cardiovascular: Present: regular rate, no murmurs


Extremities: Present: no peripheral edema bilatateraly





- Gastrointestinal


Gastrointestinal: Present: soft





- Neurologic


Broadus Coma Scale (3-15): 3 (Pupils 6 mm, round, equal, not responsive. No 

corneal, no Doll's, no gag. Face symmetric, toes down going bilaterally.)


Reflexes: 0: ankle, bicep, knee, tricep





Results





- Laboratory Findings


CBC and BMP: 


 03/11/18 15:15





 03/12/18 06:15


Abnormal Lab Findings: 


 Abnormal Labs











  03/11/18 03/11/18 03/11/18





  15:15 15:15 15:15


 


MCV  100 H  


 


Seg Neuts % (Manual)  34.0 L  


 


Lymphocytes % (Manual)  59.0 H  


 


Lymphocytes # (Manual)  6.0 H  


 


PT   18.2 H 


 


INR   1.42 H 


 


D-Dimer   > 08793 H 


 


POC ABG pH   


 


POC ABG pCO2   


 


POC ABG pO2   


 


VBG pH   


 


Sodium   


 


Potassium   


 


Chloride    95.7 L


 


Carbon Dioxide    15 L


 


BUN   


 


Creatinine   


 


Glucose    480 H


 


POC Glucose   


 


Lactic Acid   


 


Calcium    7.9 L


 


AST    55 H


 


ALT    72 H


 


Total Creatine Kinase   


 


Troponin T   


 


Total Protein    5.6 L


 


Albumin    3.3 L














  03/11/18 03/11/18 03/11/18





  15:15 15:15 15:15


 


MCV   


 


Seg Neuts % (Manual)   


 


Lymphocytes % (Manual)   


 


Lymphocytes # (Manual)   


 


PT   


 


INR   


 


D-Dimer   


 


POC ABG pH   


 


POC ABG pCO2   


 


POC ABG pO2   


 


VBG pH   6.787 L* 


 


Sodium   


 


Potassium   


 


Chloride   


 


Carbon Dioxide   


 


BUN   


 


Creatinine   


 


Glucose   


 


POC Glucose   


 


Lactic Acid  13.90 H*  


 


Calcium   


 


AST   


 


ALT   


 


Total Creatine Kinase    170 H


 


Troponin T   


 


Total Protein   


 


Albumin   














  03/11/18 03/11/18 03/11/18





  16:21 16:22 17:54


 


MCV   


 


Seg Neuts % (Manual)   


 


Lymphocytes % (Manual)   


 


Lymphocytes # (Manual)   


 


PT   


 


INR   


 


D-Dimer   


 


POC ABG pH   7.161 L 


 


POC ABG pCO2   73.3 H 


 


POC ABG pO2   243 H 


 


VBG pH   


 


Sodium   


 


Potassium   


 


Chloride   


 


Carbon Dioxide   


 


BUN   


 


Creatinine   


 


Glucose   


 


POC Glucose   


 


Lactic Acid  8.30 H*   6.00 H*


 


Calcium   


 


AST   


 


ALT   


 


Total Creatine Kinase   


 


Troponin T   


 


Total Protein   


 


Albumin   














  03/11/18 03/11/18 03/11/18





  17:54 18:50 21:55


 


MCV   


 


Seg Neuts % (Manual)   


 


Lymphocytes % (Manual)   


 


Lymphocytes # (Manual)   


 


PT   


 


INR   


 


D-Dimer   


 


POC ABG pH   


 


POC ABG pCO2   


 


POC ABG pO2   


 


VBG pH   


 


Sodium   


 


Potassium   


 


Chloride   


 


Carbon Dioxide   


 


BUN   


 


Creatinine   


 


Glucose   


 


POC Glucose   


 


Lactic Acid   4.70 H* 


 


Calcium   


 


AST   


 


ALT   


 


Total Creatine Kinase   


 


Troponin T  0.275 H* D   0.858 H* D


 


Total Protein   


 


Albumin   














  03/11/18 03/11/18 03/12/18





  21:55 22:20 01:25


 


MCV   


 


Seg Neuts % (Manual)   


 


Lymphocytes % (Manual)   


 


Lymphocytes # (Manual)   


 


PT   


 


INR   


 


D-Dimer   


 


POC ABG pH   


 


POC ABG pCO2    50.0 H


 


POC ABG pO2   


 


VBG pH   


 


Sodium   


 


Potassium   


 


Chloride   


 


Carbon Dioxide   


 


BUN   


 


Creatinine   


 


Glucose   


 


POC Glucose   166 H 


 


Lactic Acid  4.40 H*  


 


Calcium   


 


AST   


 


ALT   


 


Total Creatine Kinase   


 


Troponin T   


 


Total Protein   


 


Albumin   














  03/12/18 03/12/18 03/12/18





  05:33 06:15 06:15


 


MCV   


 


Seg Neuts % (Manual)   


 


Lymphocytes % (Manual)   


 


Lymphocytes # (Manual)   


 


PT   


 


INR   


 


D-Dimer   


 


POC ABG pH  7.488 H  


 


POC ABG pCO2  33.1 L  


 


POC ABG pO2  173 H  


 


VBG pH   


 


Sodium   154 H D 


 


Potassium   3.5 L 


 


Chloride   113.7 H 


 


Carbon Dioxide   


 


BUN   26 H 


 


Creatinine   1.6 H 


 


Glucose   204 H 


 


POC Glucose   


 


Lactic Acid    2.70 H*


 


Calcium   7.8 L 


 


AST   206 H 


 


ALT   217 H 


 


Total Creatine Kinase   


 


Troponin T   


 


Total Protein   5.9 L 


 


Albumin   3.3 L 














Assessment and Plan


1. Cardiac respiratory arrest. ICU team.


2. Probably brain death. If needed, can perform apnea test.


3. Spent > 45 minutes with the patient.


4. Plan discussed with his ICU physician, his nurse, his son, daughter in law 

and cousin.


5. Will follow up with you PRN.

## 2018-03-12 NOTE — CONSULTATION
CONSULTING PHYSICIAN:  Dr. Mathis.



REASON FOR CONSULTATION:  Acute hypoxemic respiratory failure, on mechanical

ventilator support, status post out of hospital cardiac arrest.



CHIEF COMPLAINT AND HISTORY OF PRESENT ILLNESS:  The patient is a 62-year-old

-American female with past medical history significant, perhaps in this

context both for a diagnosis of chronic obstructive lung disease that was not

home oxygen dependent, but also a history of obesity, was brought into the

Emergency Room by EMS.  According to the son who was with her, she had come back

from a recent trip to [place], [place] about 2 hours away from here and has

started complaining of feeling hot and was a little bit diaphoretic, this

happened just pretty suddenly as far as she can tell.  He stated that he knew

she was in some trouble.  He tried to call AMS; however, she looks like she

stopped breathing before they came.  When they came, the patient was found to be

essentially in a PEA arrest.  It seems like resuscitative efforts and CPR were

done both outside and also in the Emergency Room, intubation seems to have

happened in the Emergency Room and post-intubation, she was brought up to the

Intensive Care Unit where I stopped by to see her.  There was reported a

possible debris and right lower lobe infiltrate on the chest x-ray.  When I

stopped by to see her, she was lying in bed.  She was not on any sedation.  She

was on the ventilator.  She was really not responsive.  Pupils were dilated and

very sluggishly if at all reactive.  I do not have any history of nausea,

vomiting, although I cannot rule that out.  She does not have any history of

seizures.  With regards to her tobacco use or abuse history, the family had

denied smoking that is as much of the history of presentation as I have.



PAST MEDICAL HISTORY:  Chronic obstructive lung disease, hypertension.  She is

obese.



PAST SURGICAL HISTORY:  Unknown.



MEDICATIONS:  She was on at the time I stopped by to see her, included the

following:  DuoNeb nebulizer treatments scheduled q.4 hours, Unasyn 3 g IV q.6

hours, Pepcid 20 mg IV b.i.d., Levophed drip had been going at 2 mcg per minute

earlier, vasopressin at 0.03 units per minute.



ALLERGIES:  No known drug allergies.



DIET:  Obese lady.  Family denies acute weight loss or gain in the preceding few

weeks to months.



FAMILY AND SOCIAL HISTORY:  Lives in the community.  They had denied alcohol,

tobacco, illicit drug use or abuse.



FAMILY HISTORY:  Positive for hypertension.



REVIEW OF SYSTEMS:  Really unobtainable secondary to patient's medical and

mental condition since she has been here, though no gross hematochezia or

melena, no gross hematuria, no witnessed seizures.  No hematemesis.  Complete 13

review of system was attempted to be gotten; however, it is as in body of

history above or unobtainable.



PHYSICAL EXAMINATION:  At presentation in the Emergency Room,

VITAL SIGNS:  She was hypothermic 95.2 degrees Fahrenheit, pulse of 122,

respiratory rate 24, blood pressure at initial presentation 121/88, oxygen sats

at that point would reported as 64% as low as 47%.  Inspired oxygen

concentration was not recorded.

GENERAL:  Obese elderly looking -American female normocephalic,

atraumatic on the mechanical ventilator, in mild distress.

HEAD, EYES, EARS, NOSE AND THROAT:  She is anicteric.  No conjunctival erythema.

 Grossly, no palpable lymph nodes in the supraclavicular or submandibular lymph

node chains.  No gross jugular venous distention.  She has I believe a left IJ

central line in place.  No gross thyromegaly.

LUNGS:  Auscultation of both lung fields, some scattered rhonchi in the bases,

otherwise clear.  No wheezing.

HEART:  Heart sounds 1 and 2 are heard at the time of my evaluation, regular

rate and rhythm.  No rubs, no murmurs.

ABDOMEN:  Soft, full, bowel sounds are positive, did not appear tender.  No

palpable hepatosplenomegaly.

EXTREMITIES:  Without overt digital clubbing, cyanosis, or pedal edema. 

Dorsalis pedis pulses were weakly palpable.

NEUROLOGIC:  The pupils were about 5 to 6 mm.  They appeared to be

nonresponsive.  Extraocular muscle movements were not observed and she did not

have any spontaneous movements to extremities.



LABORATORY DATA:  From my review are as follows:  White cell count 10,100,

hemoglobin 12.2, hematocrit 39.4, platelet count 169,000.  No band forms

reported.  INR 1.42.  D-dimer greater than 10,000.  Venous blood gas at

presentation showed a pH of 6.79.  Serum sodium was 139, potassium 3.6, chloride

96, bicarbonate 15, BUN 15, creatinine 1.2, glucose 480.  Lactic acid level was

13.9, AST and ALT slightly elevated at 55 and 72 respectively.  Troponin was

within normal limits.  LDL cholesterol 83.  Urinalysis was unremarkable.  Her

arterial blood gas most recent is now 7.49 on the pH, pCO2 of 33, pO2 of 173. 

Serum sodium is up to 154, creatinine is 1.6 now with a BUN of 26.  Lactic acid

level is down to 2.7, AST and ALT are still in this is about 206/217.  Troponin

has peaked at 0.86 at this point.  Blood cultures, urine cultures, no growth to

date.  CT scan of the head was done.  I have reviewed the dictation and it

mentions of abnormal homogeneous density of the brain, lack of gray and white

matter identification and bothersome for diffuse anoxic encephalopathy.  CT scan

of the abdomen and pelvis was also done, possible acute cholecystitis and the

uterus with multiple fibroids.  A CT angiogram of the chest was also done as was

the chest x-ray:  ET tube is in good position.  Left IJ tip appears to be in the

brachiocephalic/superior SVC level.  The CT angiogram, I have reviewed the

radiologist's interpretation.  Again, no PE on the interpretation.  The main

finding appears to be consolidation involving mostly the right upper lobe.  I do

not see any gross filling defects consistent with pulmonary emboli.  Lung

windows really do not show a whole lot in the lower lobes.  This appears to be

mostly a right upper lobe/right middle lobe process, no gross pneumothorax, no

gross bony fracture.



ASSESSMENT:

1.  Acute hypoxemic respiratory failure, on mechanical ventilator support.

2.  Possible right upper lobe aspiration pneumonia.

3.  Out of hospital cardiac arrest.

4.  Severe anoxic encephalopathy by radiographic and clinical examination.

5.  History of chronic obstructive pulmonary disease.

6.  Obesity.

7.  Hypertension.



PLAN:  I will first of all say that the overall picture looks really bothersome

for nonreversible neurologic injury.  I have asked the family members to discuss

amongst themselves.  She has 3 kids and they are all around in the room as they

will probably be needing to make a decision and the decision hopefully will be a

unanimous decision.  Respiratory wise, we will keep him on full mechanical

ventilator support in the short time.  She will remain on the current ventilator

settings, which is tidal volume 500, rate of 16, positive end expiratory

pressure of 5, assist control mode.  Oxygen will be weaned to keep sats greater

than or equal to about 92 to 94%.  Aspiration precautions will be maintained. 

Ventilator associated pneumonia bundle will be addressed daily.  Bronchodilators

will be continued.  I will reduce the frequency.  She will be continued on

empiric antibiotic therapy in the short time; however, I suspect I will be soon

deescalating it and actually, I will probably drop that and change it to

Levaquin monotherapy at this time.  Anti-infective will ultimately be

deescalated based on the results of clinical and microbiologic data. 

Vasopressors are being weaned off at this time keeping mean arterial pressures

greater than or equal to about 65 mmHg.  She is appropriately on

gastroesophageal prophylaxis.  Venous thromboembolic disorder workup could

probably be extended to involve the bilateral lower extremity Dopplers, but I

think that at this point, we will first wait on the neurological evaluation and

on the family's decision as to care going forward.  Enteral nutrition will be

the feeding modality of choice.  Flu and pneumonia vaccination will be addressed

per protocol.



Thank you very much for the consult Dr. Mathis and Dr. Bearden.  We will follow

along.  We will make further recommendations as picture progresses/becomes

clearer.  At this time, I spent about 35 to 40 minutes of critical care time

without overlap and excluding any procedural time that may be necessary.  She is

critically ill on life-sustaining interventions including mechanical ventilator

support and at high risk for further deterioration including death.





DD: 03/12/2018 12:48

DT: 03/12/2018 21:14

JOB# [id number]  [id number]

CARA/SILVA DIAMOND

## 2018-03-12 NOTE — CONSULTATION
History of Present Illness


Consult date: 03/12/18


Requesting physician: CODEY BROOKE


Reason for consult: other (Acute Hyp[oxemic Respiratory Failure; S/P OOH 

Cardiac Arrest)


History of present illness: 





PULMONARY/CCM CONSULT NOTE (Full dictation # [id number])


Please see dictated notes for full details








Past History


Past Medical History: COPD, hypertension


Past Surgical History: No surgical history, Other (reviewed)


Social history: single.  denies: smoking, alcohol abuse, prescription drug abuse


Family history: hypertension





Medications and Allergies


 Allergies











Allergy/AdvReac Type Severity Reaction Status Date / Time


 


No Known Allergies Allergy   Unverified 03/11/18 17:14











 Home Medications











 Medication  Instructions  Recorded  Confirmed  Last Taken  Type


 


predniSONE [Deltasone] 50 mg PO QDAY #5 tab 04/19/17 10/26/17 Unknown Rx


 


ALBUTEROL Inhaler 2 puff INHALATION BID 10/25/17 10/26/17 10/25/17 History


 


Advair 100-50 Diskus 2 puff INHALATION BID 10/25/17 10/26/17 10/26/17 History


 


Lisinopril 20 mg PO DAILY 10/25/17 10/26/17 10/25/17 History


 


Montelukast 10 mg PO DAILY 10/25/17 10/26/17 10/25/17 History


 


amLODIPine 5 mg PO DAILY 10/25/17 10/26/17 10/25/17 History


 


ALBUTEROL Inhaler [ProAir HFA 2 puff IH QID PRN #1 inhalation 01/15/18  Unknown 

Rx





Inhaler]     


 


ALBUTEROL NEB's [Proventil 0.083% 2.5 mg IH TID PRN #90 neb 01/15/18  Unknown Rx





NEBS]     


 


Mometasone Furoate [Asmanex] 1 inhalation IH QPM #1 inhalation 01/15/18  

Unknown Rx


 


predniSONE [Deltasone] 60 mg PO QDAY #20 tab 01/15/18  Unknown Rx











Active Meds: 


Active Medications





Albuterol (Proventil)  2.5 mg IH Q3HRT PRN


   PRN Reason: Shortness Of Breath


Albuterol/Ipratropium (Duoneb *Not For Prn Use*)  1 ampul IH Q4HRT ANNY


   Last Admin: 03/12/18 12:34 Dose:  1 ampul


Lipase/Protease/Amylase (Pancreaze Dr 10,500 Unit)  1 each FEEDTUBE PRN PRN


   PRN Reason: For Clogged Feeding Tube


Famotidine (Pepcid)  20 mg IV BID ANNY


   Last Admin: 03/12/18 11:52 Dose:  Not Given


Hydrophilic Ointment (Vaseline Lip Therapy)  1 applic TP Q2HR PRN


   PRN Reason: Dry Lips


Ampicillin Sodium/Sulbactam Sodium (Unasyn/Ns 3 Gm/100 Ml)  3 gm in 100 mls @ 

100 mls/hr IV Q6HR ANNY; Protocol


   Last Admin: 03/12/18 11:52 Dose:  100 mls/hr


Vasopressin 20 unit/ Sodium (Chloride)  101 mls @ 9.09 mls/hr IV TITR ANNY; 

Protocol


   Last Admin: 03/12/18 07:52 Dose:  0.03 units/min, 9.09 mls/hr


Norepinephrine (Levophed Drip 4 Mg/Ns 250 Ml)  4 mg in 250 mls @ 7.5 mls/hr IV 

TITR ANNY; Protocol


   Last Titration: 03/12/18 09:48 Dose:  0 mcg/min, 0 mls/hr


Dextrose/Sodium Chloride (D5/0.45ns)  1,000 mls @ 100 mls/hr IV AS DIRECT ANNY


Multi-Ingred Cream/Lotion/Oil/Oint (Artificial Tears Ophth Oint)  1 applic OU 

Q4HR PRN


   PRN Reason: Dry Eye(s)


Simple Syrup (Simple Syrup)  15 ml FEEDTUBE PRN PRN


   PRN Reason: Hypoglycemia


Simple Syrup (Simple Syrup)  30 ml FEEDTUBE PRN PRN


   PRN Reason: Hypoglycemia


Sodium Bicarbonate (Sodium Bicarbonate)  325 mg FEEDTUBE PRN PRN


   PRN Reason: For Clogged Feeding Tube


Sodium Chloride (Nacl 0.9% 500 Ml)  1 ml IV AS DIRECT ANNY


Sodium Chloride (Sodium Chloride Flush Syringe 10 Ml)  10 ml IV BID ANNY


   Last Admin: 03/12/18 11:53 Dose:  10 ml


Sodium Chloride (Sodium Chloride Flush Syringe 10 Ml)  10 ml IV PRN PRN


   PRN Reason: LINE FLUSH











Physical Examination


Vital signs: 


 Vital Signs











Pulse Ox


 


 100 


 


 03/11/18 14:50














Results





- Laboratory Findings


CBC and BMP: 


 03/11/18 15:15





 03/12/18 06:15


ABG











POC ABG pH  7.488  (7.35-7.45)  H  03/12/18  05:33    


 


POC ABG pCO2  33.1  (35-45)  L  03/12/18  05:33    


 


POC ABG pO2  173  ()  H  03/12/18  05:33    


 


POC ABG HCO3  25.1   03/12/18  05:33    


 


POC ABG Total CO2  26   03/12/18  05:33    


 


POC ABG O2 Sat  100   03/12/18  05:33    





PT/INR, D-dimer











PT  18.2 Sec. (12.2-14.9)  H  03/11/18  15:15    


 


INR  1.42  (0.87-1.13)  H  03/11/18  15:15    


 


D-Dimer  > 91703 ng/mlDDU (0-234)  H  03/11/18  15:15    








Abnormal lab findings: 


 Abnormal Labs











  03/11/18 03/11/18 03/11/18





  15:15 15:15 15:15


 


MCV  100 H  


 


Seg Neuts % (Manual)  34.0 L  


 


Lymphocytes % (Manual)  59.0 H  


 


Lymphocytes # (Manual)  6.0 H  


 


PT   18.2 H 


 


INR   1.42 H 


 


D-Dimer   > 61858 H 


 


POC ABG pH   


 


POC ABG pCO2   


 


POC ABG pO2   


 


VBG pH   


 


Sodium   


 


Potassium   


 


Chloride    95.7 L


 


Carbon Dioxide    15 L


 


BUN   


 


Creatinine   


 


Glucose    480 H


 


POC Glucose   


 


Lactic Acid   


 


Calcium    7.9 L


 


AST    55 H


 


ALT    72 H


 


Total Creatine Kinase   


 


Troponin T   


 


Total Protein    5.6 L


 


Albumin    3.3 L














  03/11/18 03/11/18 03/11/18





  15:15 15:15 15:15


 


MCV   


 


Seg Neuts % (Manual)   


 


Lymphocytes % (Manual)   


 


Lymphocytes # (Manual)   


 


PT   


 


INR   


 


D-Dimer   


 


POC ABG pH   


 


POC ABG pCO2   


 


POC ABG pO2   


 


VBG pH   6.787 L* 


 


Sodium   


 


Potassium   


 


Chloride   


 


Carbon Dioxide   


 


BUN   


 


Creatinine   


 


Glucose   


 


POC Glucose   


 


Lactic Acid  13.90 H*  


 


Calcium   


 


AST   


 


ALT   


 


Total Creatine Kinase    170 H


 


Troponin T   


 


Total Protein   


 


Albumin   














  03/11/18 03/11/18 03/11/18





  16:21 16:22 17:54


 


MCV   


 


Seg Neuts % (Manual)   


 


Lymphocytes % (Manual)   


 


Lymphocytes # (Manual)   


 


PT   


 


INR   


 


D-Dimer   


 


POC ABG pH   7.161 L 


 


POC ABG pCO2   73.3 H 


 


POC ABG pO2   243 H 


 


VBG pH   


 


Sodium   


 


Potassium   


 


Chloride   


 


Carbon Dioxide   


 


BUN   


 


Creatinine   


 


Glucose   


 


POC Glucose   


 


Lactic Acid  8.30 H*   6.00 H*


 


Calcium   


 


AST   


 


ALT   


 


Total Creatine Kinase   


 


Troponin T   


 


Total Protein   


 


Albumin   














  03/11/18 03/11/18 03/11/18





  17:54 18:50 21:55


 


MCV   


 


Seg Neuts % (Manual)   


 


Lymphocytes % (Manual)   


 


Lymphocytes # (Manual)   


 


PT   


 


INR   


 


D-Dimer   


 


POC ABG pH   


 


POC ABG pCO2   


 


POC ABG pO2   


 


VBG pH   


 


Sodium   


 


Potassium   


 


Chloride   


 


Carbon Dioxide   


 


BUN   


 


Creatinine   


 


Glucose   


 


POC Glucose   


 


Lactic Acid   4.70 H* 


 


Calcium   


 


AST   


 


ALT   


 


Total Creatine Kinase   


 


Troponin T  0.275 H* D   0.858 H* D


 


Total Protein   


 


Albumin   














  03/11/18 03/11/18 03/12/18





  21:55 22:20 01:25


 


MCV   


 


Seg Neuts % (Manual)   


 


Lymphocytes % (Manual)   


 


Lymphocytes # (Manual)   


 


PT   


 


INR   


 


D-Dimer   


 


POC ABG pH   


 


POC ABG pCO2    50.0 H


 


POC ABG pO2   


 


VBG pH   


 


Sodium   


 


Potassium   


 


Chloride   


 


Carbon Dioxide   


 


BUN   


 


Creatinine   


 


Glucose   


 


POC Glucose   166 H 


 


Lactic Acid  4.40 H*  


 


Calcium   


 


AST   


 


ALT   


 


Total Creatine Kinase   


 


Troponin T   


 


Total Protein   


 


Albumin   














  03/12/18 03/12/18 03/12/18





  05:33 06:15 06:15


 


MCV   


 


Seg Neuts % (Manual)   


 


Lymphocytes % (Manual)   


 


Lymphocytes # (Manual)   


 


PT   


 


INR   


 


D-Dimer   


 


POC ABG pH  7.488 H  


 


POC ABG pCO2  33.1 L  


 


POC ABG pO2  173 H  


 


VBG pH   


 


Sodium   154 H D 


 


Potassium   3.5 L 


 


Chloride   113.7 H 


 


Carbon Dioxide   


 


BUN   26 H 


 


Creatinine   1.6 H 


 


Glucose   204 H 


 


POC Glucose   


 


Lactic Acid    2.70 H*


 


Calcium   7.8 L 


 


AST   206 H 


 


ALT   217 H 


 


Total Creatine Kinase   


 


Troponin T   


 


Total Protein   5.9 L 


 


Albumin   3.3 L

## 2018-03-12 NOTE — PROGRESS NOTE
Assessment and Plan


//  Anoxic brain damage


Evident on ct head


likely from severe hypoxia from cardiac arrest


consult neurology





// Cardiac arrest


Pt treated with ACLS protocol with return of perfusing rhythm, IV pressor 

support. 


cardiology consulted, preserved EF on 2d echo





// Cardiogenic shock


IV pressor support, IVF resuscitation, supportive care.





// Aspiration pneumonia


IV antibiotics, supplemental oxygen, 


CT chest showed right upper and middle love consolidation, likely from mucus 

plug vs underlying mass


will defer to pulmonary for further recommendation








//Acute Respiratory failure with hypoxia: 


Pt intubated, sedated on vent support, nebulizer therapy, supplemental oxygen, 

wean vent as tolerated, SBT daily, sedation holiday, daily ABG, Poor prognosis, 

Pulmonary consulted. 





// Metabolic acidosis


IVF resuscitation, serial bmp, monitor uop q shift, 





//elevated troponine


- likely due to cardiac arrest, cardiology consulted, started on heparin drip





// hypernatremia, start on hypotonic fluid





//OPAL, likely tubular necrosis from hypotension


- monitor renal function, start on iv fluid





// DVT prophylaxis


heparin








The high probability of a clinically significant, sudden or life threatening 

deterioration of the [pulmonary, cardiac, renal] system(s) required my full and 

direct attention, intervention and personal management. The aggregate critical 

care time was [65] minutes. This time is in addition to time spent performing 

reported procedures but includes the following: 





[x] Data Review and interpretation 





[x] Patient assessment and monitoring of vital signs 





[x] Documentation 





[x] Medication orders and management





Brief History: Patient is a 62-year-old female past medical history of asthma 

COPD and hypertension who is presenting cardiac arrest.  Patient's family 

called 911 because of diaphoresis and shortness of breath.  When EMS arrived 

they initiated ACLS protocol.  The patient was in [place] most of the ride to the 

emergency department.





Physical exam:


General appearance: other (intubated)


HEENT: atraumatic, constricted pupil


Neck: Positive: neck supple, trachea midline


Cardiac: Positive: Reg Rate and Rhythm, S1/S2


Lungs: Positive: Rhonchi (expiratory), Oxygen, Ventilated Respirations


Neuro: Positive: Other (unresponsive)


Skin: Positive: Clear.  Negative: Rash, Wound


Musculoskeletal: No Fluid Collection, No Pain, Normal Range of Motion


Extremities: Absent: edema


Psych: unable to assess











Subjective


Date of service: 03/12/18


Interval history: 





Pt seen and examined


Intubated without sedation


updated family at bedside











Objective





- Constitutional


Vitals: 


 Vital Signs - 12hr











  03/11/18 03/11/18 03/11/18





  23:45 23:46 23:59


 


Temperature  93.7 F L 


 


Pulse Rate 87  86


 


Pulse Rate [   





Anterior   





Bilateral   





Throughout]   


 


Respiratory 24  24





Rate   


 


Respiratory   





Rate [Anterior   





Bilateral   





Throughout]   


 


Blood Pressure 146/111  130/102


 


O2 Sat by Pulse 100  100





Oximetry   














  03/12/18 03/12/18 03/12/18





  00:00 00:11 00:15


 


Temperature 93.0 F L  


 


Pulse Rate 87 87 93 H


 


Pulse Rate [   





Anterior   





Bilateral   





Throughout]   


 


Respiratory 20  23





Rate   


 


Respiratory   





Rate [Anterior   





Bilateral   





Throughout]   


 


Blood Pressure 165/116 165/116 165/116


 


O2 Sat by Pulse 100 100 100





Oximetry   














  03/12/18 03/12/18 03/12/18





  00:30 00:45 01:00


 


Temperature  93.1 F L 


 


Pulse Rate 90 90 85


 


Pulse Rate [   87





Anterior   





Bilateral   





Throughout]   


 


Respiratory 24 24 12





Rate   


 


Respiratory   26 H





Rate [Anterior   





Bilateral   





Throughout]   


 


Blood Pressure 98/71 87/65 96/74


 


O2 Sat by Pulse 100 100 100





Oximetry   














  03/12/18 03/12/18 03/12/18





  01:15 01:25 01:30


 


Temperature   


 


Pulse Rate 87 87 90


 


Pulse Rate [   





Anterior   





Bilateral   





Throughout]   


 


Respiratory 25 H  26 H





Rate   


 


Respiratory   





Rate [Anterior   





Bilateral   





Throughout]   


 


Blood Pressure 86/62 86/62 81/53


 


O2 Sat by Pulse 97 96 96





Oximetry   














  03/12/18 03/12/18 03/12/18





  01:40 01:45 02:00


 


Temperature   


 


Pulse Rate  90 91 H


 


Pulse Rate [ 90  





Anterior   





Bilateral   





Throughout]   


 


Respiratory  26 H 26 H





Rate   


 


Respiratory 26 H  





Rate [Anterior   





Bilateral   





Throughout]   


 


Blood Pressure  87/62 100/74


 


O2 Sat by Pulse  98 98





Oximetry   














  03/12/18 03/12/18 03/12/18





  02:15 02:30 02:34


 


Temperature   95.5 F L


 


Pulse Rate 91 H 92 H 


 


Pulse Rate [   





Anterior   





Bilateral   





Throughout]   


 


Respiratory 26 H 26 H 





Rate   


 


Respiratory   





Rate [Anterior   





Bilateral   





Throughout]   


 


Blood Pressure 105/78 111/83 


 


O2 Sat by Pulse 100 98 





Oximetry   














  03/12/18 03/12/18 03/12/18





  02:45 03:00 03:03


 


Temperature   96.1 F L


 


Pulse Rate 92 H 92 H 


 


Pulse Rate [   





Anterior   





Bilateral   





Throughout]   


 


Respiratory 26 H 26 H 





Rate   


 


Respiratory   





Rate [Anterior   





Bilateral   





Throughout]   


 


Blood Pressure 115/84 125/82 


 


O2 Sat by Pulse 99 100 





Oximetry   














  03/12/18 03/12/18 03/12/18





  03:15 03:30 03:45


 


Temperature   


 


Pulse Rate 97 H 93 H 92 H


 


Pulse Rate [   





Anterior   





Bilateral   





Throughout]   


 


Respiratory 26 H 26 H 26 H





Rate   


 


Respiratory   





Rate [Anterior   





Bilateral   





Throughout]   


 


Blood Pressure 129/90 125/86 122/86


 


O2 Sat by Pulse 100 98 99





Oximetry   














  03/12/18 03/12/18 03/12/18





  04:00 04:15 04:25


 


Temperature 97.7 F  


 


Pulse Rate 92 H 91 H 


 


Pulse Rate [   





Anterior   





Bilateral   





Throughout]   


 


Respiratory 26 H 26 H 24





Rate   


 


Respiratory   





Rate [Anterior   





Bilateral   





Throughout]   


 


Blood Pressure 120/85 119/82 


 


O2 Sat by Pulse 97 99 100





Oximetry   














  03/12/18 03/12/18 03/12/18





  04:30 04:45 04:50


 


Temperature   


 


Pulse Rate 91 H 91 H 


 


Pulse Rate [   92 H





Anterior   





Bilateral   





Throughout]   


 


Respiratory 26 H 26 H 





Rate   


 


Respiratory   26 H





Rate [Anterior   





Bilateral   





Throughout]   


 


Blood Pressure 127/89 129/86 


 


O2 Sat by Pulse 99 100 





Oximetry   














  03/12/18 03/12/18 03/12/18





  05:00 05:12 05:15


 


Temperature   


 


Pulse Rate 91 H  91 H


 


Pulse Rate [  91 H 





Anterior   





Bilateral   





Throughout]   


 


Respiratory 26 H  26 H





Rate   


 


Respiratory  26 H 





Rate [Anterior   





Bilateral   





Throughout]   


 


Blood Pressure 131/90  131/94


 


O2 Sat by Pulse 99  100





Oximetry   














  03/12/18 03/12/18 03/12/18





  05:30 05:45 06:00


 


Temperature   


 


Pulse Rate 93 H 91 H 91 H


 


Pulse Rate [   





Anterior   





Bilateral   





Throughout]   


 


Respiratory 24 24 24





Rate   


 


Respiratory   





Rate [Anterior   





Bilateral   





Throughout]   


 


Blood Pressure 130/94 139/94 130/86


 


O2 Sat by Pulse  99 99





Oximetry   














  03/12/18 03/12/18 03/12/18





  06:15 06:30 06:45


 


Temperature   


 


Pulse Rate 91 H 94 H 94 H


 


Pulse Rate [   





Anterior   





Bilateral   





Throughout]   


 


Respiratory 24 24 24





Rate   


 


Respiratory   





Rate [Anterior   





Bilateral   





Throughout]   


 


Blood Pressure 126/84 134/91 144/99


 


O2 Sat by Pulse 98 98 99





Oximetry   














  03/12/18 03/12/18 03/12/18





  07:00 07:15 07:30


 


Temperature   


 


Pulse Rate 95 H 97 H 97 H


 


Pulse Rate [   





Anterior   





Bilateral   





Throughout]   


 


Respiratory 24 24 24





Rate   


 


Respiratory   





Rate [Anterior   





Bilateral   





Throughout]   


 


Blood Pressure 141/93 155/101 154/91


 


O2 Sat by Pulse 98 100 98





Oximetry   














  03/12/18 03/12/18 03/12/18





  07:45 08:00 08:15


 


Temperature  97.4 F L 


 


Pulse Rate 100 H 101 H 101 H


 


Pulse Rate [   





Anterior   





Bilateral   





Throughout]   


 


Respiratory 23 24 24





Rate   


 


Respiratory   





Rate [Anterior   





Bilateral   





Throughout]   


 


Blood Pressure 157/97 152/99 149/94


 


O2 Sat by Pulse 97 98 98





Oximetry   














  03/12/18 03/12/18 03/12/18





  08:30 08:33 08:40


 


Temperature   


 


Pulse Rate 101 H 101 H 


 


Pulse Rate [   101 H





Anterior   





Bilateral   





Throughout]   


 


Respiratory 24  





Rate   


 


Respiratory   24





Rate [Anterior   





Bilateral   





Throughout]   


 


Blood Pressure 156/101 150/93 


 


O2 Sat by Pulse 98 99 





Oximetry   














  03/12/18 03/12/18 03/12/18





  08:45 08:48 09:00


 


Temperature   


 


Pulse Rate 102 H  105 H


 


Pulse Rate [  102 H 





Anterior   





Bilateral   





Throughout]   


 


Respiratory 22  24





Rate   


 


Respiratory  24 





Rate [Anterior   





Bilateral   





Throughout]   


 


Blood Pressure 139/97  159/99


 


O2 Sat by Pulse 98  98





Oximetry   














  03/12/18 03/12/18 03/12/18





  09:15 09:30 09:45


 


Temperature   


 


Pulse Rate 107 H 108 H 109 H


 


Pulse Rate [   





Anterior   





Bilateral   





Throughout]   


 


Respiratory 24 24 24





Rate   


 


Respiratory   





Rate [Anterior   





Bilateral   





Throughout]   


 


Blood Pressure 158/100 169/106 147/100


 


O2 Sat by Pulse 98 98 99





Oximetry   














  03/12/18





  10:00


 


Temperature 


 


Pulse Rate 108 H


 


Pulse Rate [ 





Anterior 





Bilateral 





Throughout] 


 


Respiratory 24





Rate 


 


Respiratory 





Rate [Anterior 





Bilateral 





Throughout] 


 


Blood Pressure 142/97


 


O2 Sat by Pulse 98





Oximetry 














- Labs


CBC & Chem 7: 


 03/12/18 17:49





 03/12/18 06:15


Labs: 


 Abnormal lab results











  03/11/18 03/11/18 03/11/18 Range/Units





  15:15 15:15 15:15 


 


MCV  100 H    (79-97)  fl


 


Seg Neuts % (Manual)  34.0 L    (40.0-70.0)  %


 


Lymphocytes % (Manual)  59.0 H    (13.4-35.0)  %


 


Lymphocytes # (Manual)  6.0 H    (1.2-5.4)  K/mm3


 


PT   18.2 H   (12.2-14.9)  Sec.


 


INR   1.42 H   (0.87-1.13)  


 


D-Dimer   > 58860 H   (0-234)  ng/mlDDU


 


POC ABG pH     (7.35-7.45)  


 


POC ABG pCO2     (35-45)  


 


POC ABG pO2     ()  


 


VBG pH     (7.320-7.420)  


 


Sodium     (137-145)  mmol/L


 


Potassium     (3.6-5.0)  mmol/L


 


Chloride    95.7 L  ()  mmol/L


 


Carbon Dioxide    15 L  (22-30)  mmol/L


 


BUN     (7-17)  mg/dL


 


Creatinine     (0.7-1.2)  mg/dL


 


Glucose    480 H  ()  mg/dL


 


POC Glucose     ()  


 


Lactic Acid     (0.7-2.0)  mmol/L


 


Calcium    7.9 L  (8.4-10.2)  mg/dL


 


AST    55 H  (5-40)  units/L


 


ALT    72 H  (7-56)  units/L


 


Total Creatine Kinase     ()  units/L


 


Troponin T     (0.00-0.029)  ng/mL


 


Total Protein    5.6 L  (6.3-8.2)  g/dL


 


Albumin    3.3 L  (3.9-5)  g/dL














  03/11/18 03/11/18 03/11/18 Range/Units





  15:15 15:15 15:15 


 


MCV     (79-97)  fl


 


Seg Neuts % (Manual)     (40.0-70.0)  %


 


Lymphocytes % (Manual)     (13.4-35.0)  %


 


Lymphocytes # (Manual)     (1.2-5.4)  K/mm3


 


PT     (12.2-14.9)  Sec.


 


INR     (0.87-1.13)  


 


D-Dimer     (0-234)  ng/mlDDU


 


POC ABG pH     (7.35-7.45)  


 


POC ABG pCO2     (35-45)  


 


POC ABG pO2     ()  


 


VBG pH   6.787 L*   (7.320-7.420)  


 


Sodium     (137-145)  mmol/L


 


Potassium     (3.6-5.0)  mmol/L


 


Chloride     ()  mmol/L


 


Carbon Dioxide     (22-30)  mmol/L


 


BUN     (7-17)  mg/dL


 


Creatinine     (0.7-1.2)  mg/dL


 


Glucose     ()  mg/dL


 


POC Glucose     ()  


 


Lactic Acid  13.90 H*    (0.7-2.0)  mmol/L


 


Calcium     (8.4-10.2)  mg/dL


 


AST     (5-40)  units/L


 


ALT     (7-56)  units/L


 


Total Creatine Kinase    170 H  ()  units/L


 


Troponin T     (0.00-0.029)  ng/mL


 


Total Protein     (6.3-8.2)  g/dL


 


Albumin     (3.9-5)  g/dL














  03/11/18 03/11/18 03/11/18 Range/Units





  16:21 16:22 17:54 


 


MCV     (79-97)  fl


 


Seg Neuts % (Manual)     (40.0-70.0)  %


 


Lymphocytes % (Manual)     (13.4-35.0)  %


 


Lymphocytes # (Manual)     (1.2-5.4)  K/mm3


 


PT     (12.2-14.9)  Sec.


 


INR     (0.87-1.13)  


 


D-Dimer     (0-234)  ng/mlDDU


 


POC ABG pH   7.161 L   (7.35-7.45)  


 


POC ABG pCO2   73.3 H   (35-45)  


 


POC ABG pO2   243 H   ()  


 


VBG pH     (7.320-7.420)  


 


Sodium     (137-145)  mmol/L


 


Potassium     (3.6-5.0)  mmol/L


 


Chloride     ()  mmol/L


 


Carbon Dioxide     (22-30)  mmol/L


 


BUN     (7-17)  mg/dL


 


Creatinine     (0.7-1.2)  mg/dL


 


Glucose     ()  mg/dL


 


POC Glucose     ()  


 


Lactic Acid  8.30 H*   6.00 H*  (0.7-2.0)  mmol/L


 


Calcium     (8.4-10.2)  mg/dL


 


AST     (5-40)  units/L


 


ALT     (7-56)  units/L


 


Total Creatine Kinase     ()  units/L


 


Troponin T     (0.00-0.029)  ng/mL


 


Total Protein     (6.3-8.2)  g/dL


 


Albumin     (3.9-5)  g/dL














  03/11/18 03/11/18 03/11/18 Range/Units





  17:54 18:50 21:55 


 


MCV     (79-97)  fl


 


Seg Neuts % (Manual)     (40.0-70.0)  %


 


Lymphocytes % (Manual)     (13.4-35.0)  %


 


Lymphocytes # (Manual)     (1.2-5.4)  K/mm3


 


PT     (12.2-14.9)  Sec.


 


INR     (0.87-1.13)  


 


D-Dimer     (0-234)  ng/mlDDU


 


POC ABG pH     (7.35-7.45)  


 


POC ABG pCO2     (35-45)  


 


POC ABG pO2     ()  


 


VBG pH     (7.320-7.420)  


 


Sodium     (137-145)  mmol/L


 


Potassium     (3.6-5.0)  mmol/L


 


Chloride     ()  mmol/L


 


Carbon Dioxide     (22-30)  mmol/L


 


BUN     (7-17)  mg/dL


 


Creatinine     (0.7-1.2)  mg/dL


 


Glucose     ()  mg/dL


 


POC Glucose     ()  


 


Lactic Acid   4.70 H*   (0.7-2.0)  mmol/L


 


Calcium     (8.4-10.2)  mg/dL


 


AST     (5-40)  units/L


 


ALT     (7-56)  units/L


 


Total Creatine Kinase     ()  units/L


 


Troponin T  0.275 H* D   0.858 H* D  (0.00-0.029)  ng/mL


 


Total Protein     (6.3-8.2)  g/dL


 


Albumin     (3.9-5)  g/dL














  03/11/18 03/11/18 03/12/18 Range/Units





  21:55 22:20 01:25 


 


MCV     (79-97)  fl


 


Seg Neuts % (Manual)     (40.0-70.0)  %


 


Lymphocytes % (Manual)     (13.4-35.0)  %


 


Lymphocytes # (Manual)     (1.2-5.4)  K/mm3


 


PT     (12.2-14.9)  Sec.


 


INR     (0.87-1.13)  


 


D-Dimer     (0-234)  ng/mlDDU


 


POC ABG pH     (7.35-7.45)  


 


POC ABG pCO2    50.0 H  (35-45)  


 


POC ABG pO2     ()  


 


VBG pH     (7.320-7.420)  


 


Sodium     (137-145)  mmol/L


 


Potassium     (3.6-5.0)  mmol/L


 


Chloride     ()  mmol/L


 


Carbon Dioxide     (22-30)  mmol/L


 


BUN     (7-17)  mg/dL


 


Creatinine     (0.7-1.2)  mg/dL


 


Glucose     ()  mg/dL


 


POC Glucose   166 H   ()  


 


Lactic Acid  4.40 H*    (0.7-2.0)  mmol/L


 


Calcium     (8.4-10.2)  mg/dL


 


AST     (5-40)  units/L


 


ALT     (7-56)  units/L


 


Total Creatine Kinase     ()  units/L


 


Troponin T     (0.00-0.029)  ng/mL


 


Total Protein     (6.3-8.2)  g/dL


 


Albumin     (3.9-5)  g/dL














  03/12/18 03/12/18 03/12/18 Range/Units





  05:33 06:15 06:15 


 


MCV     (79-97)  fl


 


Seg Neuts % (Manual)     (40.0-70.0)  %


 


Lymphocytes % (Manual)     (13.4-35.0)  %


 


Lymphocytes # (Manual)     (1.2-5.4)  K/mm3


 


PT     (12.2-14.9)  Sec.


 


INR     (0.87-1.13)  


 


D-Dimer     (0-234)  ng/mlDDU


 


POC ABG pH  7.488 H    (7.35-7.45)  


 


POC ABG pCO2  33.1 L    (35-45)  


 


POC ABG pO2  173 H    ()  


 


VBG pH     (7.320-7.420)  


 


Sodium   154 H D   (137-145)  mmol/L


 


Potassium   3.5 L   (3.6-5.0)  mmol/L


 


Chloride   113.7 H   ()  mmol/L


 


Carbon Dioxide     (22-30)  mmol/L


 


BUN   26 H   (7-17)  mg/dL


 


Creatinine   1.6 H   (0.7-1.2)  mg/dL


 


Glucose   204 H   ()  mg/dL


 


POC Glucose     ()  


 


Lactic Acid    2.70 H*  (0.7-2.0)  mmol/L


 


Calcium   7.8 L   (8.4-10.2)  mg/dL


 


AST   206 H   (5-40)  units/L


 


ALT   217 H   (7-56)  units/L


 


Total Creatine Kinase     ()  units/L


 


Troponin T     (0.00-0.029)  ng/mL


 


Total Protein   5.9 L   (6.3-8.2)  g/dL


 


Albumin   3.3 L   (3.9-5)  g/dL

## 2018-03-12 NOTE — XRAY REPORT
FINAL REPORT



EXAM:  XR ABDOMEN 1V AP



HISTORY:  [place] sump placement 



TECHNIQUE:  Limited supine views of the abdomen 



PRIORS:  None.



FINDINGS:  

Nasogastric tube is in place and looped within the upper stomach.

Both the tip and side port are present within the stomach. 



The bowel gas pattern is nonspecific. No free air is identified. 

Soft tissues have no evidence for mass shadows or calcifications.

The bony structures are intact.



IMPRESSION:  

Satisfactory placement of a nasogastric tube within the stomach.

## 2018-03-12 NOTE — CONSULTATION
History of Present Illness


Consult date: 03/12/18


Requesting physician: MALI GALLARDO


Consult reason: elevated troponin


History of present illness: 


The pt is a 63 YO female with a past medical history significant for asthma. 

She is previously unknown to our practice. Pt is intubated on evaluation and 

thus HPI is obtained per the chart and per pt's daughter at bedside. Per pt's 

daughter, pt has been feeling well and in her normal state of health. This past 

weekend, the pt drove to [place], [place], and back. The pt returned from [place] 

yesterday and arrived home without incident. Around 1PM yesterday, the pt c/o 

increased SOB and diaphoresis. The pt took several of her breathing treatments 

without relief and then suddenly became unresponsive at home. This was 

witnessed by pt's son. Per the chart, pt was in cardiac arrest when EMS arrived 

and they initiated ACLS protocol. The patient was in [place] most of the ride to 

the ED. Following [facility] arrival, pt found to have RUL pneumonia consistent with 

aspiration pneumonia. Pt admitted to ICU. CT head consistent with DOLORES. ECG c/w 

diffuse ischemia. Troponins elevated and trending upwards. 











Past History


Past Medical History: other (asthma)


Past Surgical History: No surgical history


Social history: single.  denies: smoking, alcohol abuse, prescription drug abuse


Family history: hypertension





Medications and Allergies


 Allergies











Allergy/AdvReac Type Severity Reaction Status Date / Time


 


No Known Allergies Allergy   Unverified 03/11/18 17:14











 Home Medications











 Medication  Instructions  Recorded  Confirmed  Last Taken  Type


 


predniSONE [Deltasone] 50 mg PO QDAY #5 tab 04/19/17 10/26/17 Unknown Rx


 


ALBUTEROL Inhaler 2 puff INHALATION BID 10/25/17 10/26/17 10/25/17 History


 


Advair 100-50 Diskus 2 puff INHALATION BID 10/25/17 10/26/17 10/26/17 History


 


Lisinopril 20 mg PO DAILY 10/25/17 10/26/17 10/25/17 History


 


Montelukast 10 mg PO DAILY 10/25/17 10/26/17 10/25/17 History


 


amLODIPine 5 mg PO DAILY 10/25/17 10/26/17 10/25/17 History


 


ALBUTEROL Inhaler [ProAir HFA 2 puff IH QID PRN #1 inhalation 01/15/18  Unknown 

Rx





Inhaler]     


 


ALBUTEROL NEB's [Proventil 0.083% 2.5 mg IH TID PRN #90 neb 01/15/18  Unknown Rx





NEBS]     


 


Mometasone Furoate [Asmanex] 1 inhalation IH QPM #1 inhalation 01/15/18  

Unknown Rx


 


predniSONE [Deltasone] 60 mg PO QDAY #20 tab 01/15/18  Unknown Rx











Active Meds: 


Active Medications





Albuterol (Proventil)  2.5 mg IH Q3HRT PRN


   PRN Reason: Shortness Of Breath


Albuterol/Ipratropium (Duoneb *Not For Prn Use*)  1 ampul IH Q4HRT ANNY


   Last Admin: 03/12/18 12:34 Dose:  1 ampul


Lipase/Protease/Amylase (Pancreaze Dr 10,500 Unit)  1 each FEEDTUBE PRN PRN


   PRN Reason: For Clogged Feeding Tube


Famotidine (Pepcid)  20 mg IV BID ANNY


   Last Admin: 03/12/18 11:52 Dose:  Not Given


Heparin Sodium (Porcine) (Heparin)  5,000 unit SUB-Q Q8HR ANNY


Hydrophilic Ointment (Vaseline Lip Therapy)  1 applic TP Q2HR PRN


   PRN Reason: Dry Lips


Vasopressin 20 unit/ Sodium (Chloride)  101 mls @ 9.09 mls/hr IV TITR ANNY; 

Protocol


   Last Admin: 03/12/18 07:52 Dose:  0.03 units/min, 9.09 mls/hr


Norepinephrine (Levophed Drip 4 Mg/Ns 250 Ml)  4 mg in 250 mls @ 7.5 mls/hr IV 

TITR ANNY; Protocol


   Last Titration: 03/12/18 09:48 Dose:  0 mcg/min, 0 mls/hr


Dextrose/Sodium Chloride (D5/0.45ns)  1,000 mls @ 100 mls/hr IV AS DIRECT ANNY


   Last Admin: 03/12/18 14:07 Dose:  100 mls/hr


Levofloxacin/Dextrose (Levaquin 750mg/150ml)  750 mg in 150 mls @ 100 mls/hr IV 

Q24HR ANNY


   Stop: 03/17/18 11:29


Multi-Ingred Cream/Lotion/Oil/Oint (Artificial Tears Ophth Oint)  1 applic OU 

Q4HR PRN


   PRN Reason: Dry Eye(s)


Simple Syrup (Simple Syrup)  15 ml FEEDTUBE PRN PRN


   PRN Reason: Hypoglycemia


Simple Syrup (Simple Syrup)  30 ml FEEDTUBE PRN PRN


   PRN Reason: Hypoglycemia


Sodium Bicarbonate (Sodium Bicarbonate)  325 mg FEEDTUBE PRN PRN


   PRN Reason: For Clogged Feeding Tube


Sodium Chloride (Nacl 0.9% 500 Ml)  1 ml IV AS DIRECT ANNY


Sodium Chloride (Sodium Chloride Flush Syringe 10 Ml)  10 ml IV BID ANNY


   Last Admin: 03/12/18 11:53 Dose:  10 ml


Sodium Chloride (Sodium Chloride Flush Syringe 10 Ml)  10 ml IV PRN PRN


   PRN Reason: LINE FLUSH











Review of Systems


ROS unobtainable: due to endotracheal tube, due to mental status





Physical Examination


 Vital Signs











Pulse Ox


 


 100 


 


 03/11/18 14:50











General appearance: other (intubated)


Neck: Positive: neck supple, trachea midline


Cardiac: Positive: Reg Rate and Rhythm, S1/S2


Lungs: Positive: Rhonchi (expiratory), Oxygen, Ventilated Respirations


Neuro: Positive: Other (unresponsive)


Skin: Positive: Clear.  Negative: Rash, Wound


Musculoskeletal: No Fluid Collection, No Pain, Normal Range of Motion


Extremities: Absent: edema





Results





 03/11/18 15:15





 03/12/18 06:15


 Cardiac Enzymes











  03/11/18 03/11/18 03/12/18 Range/Units





  15:15 15:15 06:15 


 


AST  55 H   206 H  (5-40)  units/L


 


CK-MB (CK-2)   1.9   (0.0-4.0)  ng/mL








 Coagulation











  03/11/18 Range/Units





  15:15 


 


PT  18.2 H  (12.2-14.9)  Sec.


 


INR  1.42 H  (0.87-1.13)  








 Lipids











  03/11/18 Range/Units





  17:54 


 


Triglycerides  48  (2-149)  mg/dL


 


Cholesterol  134  ()  mg/dL


 


HDL Cholesterol  49  (40-59)  mg/dL


 


Cholesterol/HDL Ratio  2.73  %








 CBC











  03/11/18 Range/Units





  15:15 


 


WBC  10.1  (4.5-11.0)  K/mm3


 


RBC  3.95  (3.65-5.03)  M/mm3


 


Hgb  12.2  (10.1-14.3)  gm/dl


 


Hct  39.4  (30.3-42.9)  %


 


Plt Count  169  (140-440)  K/mm3


 


Lymph #  Np  


 


Baso #  Np  








 Comprehensive Metabolic Panel











  03/11/18 03/12/18 Range/Units





  15:15 06:15 


 


Sodium  139  154 H D  (137-145)  mmol/L


 


Potassium  3.6  3.5 L  (3.6-5.0)  mmol/L


 


Chloride  95.7 L  113.7 H  ()  mmol/L


 


Carbon Dioxide  15 L  24  D  (22-30)  mmol/L


 


BUN  15  26 H  (7-17)  mg/dL


 


Creatinine  1.2  1.6 H  (0.7-1.2)  mg/dL


 


Glucose  480 H  204 H  ()  mg/dL


 


Calcium  7.9 L  7.8 L  (8.4-10.2)  mg/dL


 


AST  55 H  206 H  (5-40)  units/L


 


ALT  72 H  217 H  (7-56)  units/L


 


Alkaline Phosphatase  85  96  ()  units/L


 


Total Protein  5.6 L  5.9 L  (6.3-8.2)  g/dL


 


Albumin  3.3 L  3.3 L  (3.9-5)  g/dL














- Imaging and Cardiology


Echo: pending


EKG: report reviewed, image reviewed





EKG interpretations





- Telemetry


EKG Rhythm: Sinus Rhythm





- EKG


Sinus rhythms and dysrhythmias: sinus rhythm


Repolarization changes or abnormalities: ST or T wave suggestive of ischemia (

diffuse)





Assessment and Plan


Assessment:


S/p PEA arrest 


NSTEMI type I


Abnormal ECG - consistent with diffuse ischemia


Acute respiratory failure - intubated


PNA - ? aspiration


AMS - head CT c/w diffuse anoxic injury


Metabolic acidosis  


Hypernatremia


Elevated DDimer - chest CTA negative for PE


H/o asthma





Plan:


Initiate heparin gtt.


Cont serial ECGs. 


Obtain echo. 


Consider ischemic evaluation once medically stabilized. 


Pt with "probably brain death" per neurology. 


Assessment and plan reviewed with pt's family members at bedside. 





The patient has been seen in conjunction with Dr. Spicer who agrees with the 

assessment and plan of care.

## 2018-03-13 NOTE — PROGRESS NOTE
Subjective


Date of service: 03/13/18


Principal diagnosis: Anoxic brain injury.


Interval history: 


No significant changes since yesterday





Objective





- Vital Sign


 Vital Signs - 12hr











  03/12/18 03/12/18 03/12/18





  22:45 23:00 23:15


 


Temperature   


 


Pulse Rate 92 H 93 H 91 H


 


Pulse Rate [   





Anterior   





Bilateral   





Throughout]   


 


Respiratory 16 16 13





Rate   


 


Respiratory   





Rate [Anterior   





Bilateral   





Throughout]   


 


Blood Pressure 98/65 97/66 103/69


 


O2 Sat by Pulse 98 98 





Oximetry   














  03/12/18 03/12/18 03/12/18





  23:18 23:28 23:30


 


Temperature   


 


Pulse Rate 92 H 91 H 91 H


 


Pulse Rate [   





Anterior   





Bilateral   





Throughout]   


 


Respiratory  16 17





Rate   


 


Respiratory   





Rate [Anterior   





Bilateral   





Throughout]   


 


Blood Pressure 97/66 96/65 102/65


 


O2 Sat by Pulse 100 100 100





Oximetry   














  03/12/18 03/12/18 03/13/18





  23:45 23:57 00:00


 


Temperature  97.9 F 


 


Pulse Rate 92 H  92 H


 


Pulse Rate [   





Anterior   





Bilateral   





Throughout]   


 


Respiratory 12  16





Rate   


 


Respiratory   





Rate [Anterior   





Bilateral   





Throughout]   


 


Blood Pressure 99/71  99/71


 


O2 Sat by Pulse   96





Oximetry   














  03/13/18 03/13/18 03/13/18





  00:15 00:30 00:45


 


Temperature   


 


Pulse Rate 92 H 92 H 90


 


Pulse Rate [   





Anterior   





Bilateral   





Throughout]   


 


Respiratory 14 16 17





Rate   


 


Respiratory   





Rate [Anterior   





Bilateral   





Throughout]   


 


Blood Pressure 101/65 103/71 102/67


 


O2 Sat by Pulse 97 98 98





Oximetry   














  03/13/18 03/13/18 03/13/18





  01:00 01:05 01:14


 


Temperature   


 


Pulse Rate 92 H  


 


Pulse Rate [  91 H 93 H





Anterior   





Bilateral   





Throughout]   


 


Respiratory 16  





Rate   


 


Respiratory  16 16





Rate [Anterior   





Bilateral   





Throughout]   


 


Blood Pressure 95/69  


 


O2 Sat by Pulse 97  





Oximetry   














  03/13/18 03/13/18 03/13/18





  01:15 01:30 01:45


 


Temperature   


 


Pulse Rate 92 H 93 H 93 H


 


Pulse Rate [   





Anterior   





Bilateral   





Throughout]   


 


Respiratory 16 16 15





Rate   


 


Respiratory   





Rate [Anterior   





Bilateral   





Throughout]   


 


Blood Pressure 105/73 105/64 95/65


 


O2 Sat by Pulse  98 98





Oximetry   














  03/13/18 03/13/18 03/13/18





  02:00 02:15 02:30


 


Temperature   


 


Pulse Rate 93 H 92 H 93 H


 


Pulse Rate [   





Anterior   





Bilateral   





Throughout]   


 


Respiratory 15 14 12





Rate   


 


Respiratory   





Rate [Anterior   





Bilateral   





Throughout]   


 


Blood Pressure 105/68 102/64 98/63


 


O2 Sat by Pulse 97 99 





Oximetry   














  03/13/18 03/13/18 03/13/18





  02:45 03:00 03:15


 


Temperature   


 


Pulse Rate 95 H 90 89


 


Pulse Rate [   





Anterior   





Bilateral   





Throughout]   


 


Respiratory 12 16 16





Rate   


 


Respiratory   





Rate [Anterior   





Bilateral   





Throughout]   


 


Blood Pressure 101/71 85/54 92/58


 


O2 Sat by Pulse 99 99 99





Oximetry   














  03/13/18 03/13/18 03/13/18





  03:21 03:30 03:45


 


Temperature   


 


Pulse Rate 89 90 90


 


Pulse Rate [   





Anterior   





Bilateral   





Throughout]   


 


Respiratory  16 16





Rate   


 


Respiratory   





Rate [Anterior   





Bilateral   





Throughout]   


 


Blood Pressure 92/58 126/78 126/82


 


O2 Sat by Pulse 100  99





Oximetry   














  03/13/18 03/13/18 03/13/18





  03:48 04:00 04:15


 


Temperature 98.9 F  


 


Pulse Rate  89 89


 


Pulse Rate [   





Anterior   





Bilateral   





Throughout]   


 


Respiratory  16 16





Rate   


 


Respiratory   





Rate [Anterior   





Bilateral   





Throughout]   


 


Blood Pressure  128/85 143/93


 


O2 Sat by Pulse  99 99





Oximetry   














  03/13/18 03/13/18 03/13/18





  04:29 04:30 04:46


 


Temperature   


 


Pulse Rate  90 89


 


Pulse Rate [   





Anterior   





Bilateral   





Throughout]   


 


Respiratory 16 16 16





Rate   


 


Respiratory   





Rate [Anterior   





Bilateral   





Throughout]   


 


Blood Pressure  126/80 121/77


 


O2 Sat by Pulse 100 99 98





Oximetry   














  03/13/18 03/13/18 03/13/18





  05:00 05:15 05:30


 


Temperature   


 


Pulse Rate 88 88 86


 


Pulse Rate [   





Anterior   





Bilateral   





Throughout]   


 


Respiratory 16 16 16





Rate   


 


Respiratory   





Rate [Anterior   





Bilateral   





Throughout]   


 


Blood Pressure 114/77 110/74 105/66


 


O2 Sat by Pulse 98 99 98





Oximetry   














  03/13/18 03/13/18 03/13/18





  05:45 06:00 06:15


 


Temperature   


 


Pulse Rate 86 85 84


 


Pulse Rate [   





Anterior   





Bilateral   





Throughout]   


 


Respiratory 16 16 16





Rate   


 


Respiratory   





Rate [Anterior   





Bilateral   





Throughout]   


 


Blood Pressure 100/68 109/65 101/65


 


O2 Sat by Pulse 99 98 99





Oximetry   














  03/13/18 03/13/18 03/13/18





  06:30 06:45 07:00


 


Temperature   


 


Pulse Rate 84 83 83


 


Pulse Rate [   





Anterior   





Bilateral   





Throughout]   


 


Respiratory 16 16 16





Rate   


 


Respiratory   





Rate [Anterior   





Bilateral   





Throughout]   


 


Blood Pressure 97/68 101/70 101/67


 


O2 Sat by Pulse 99 99 99





Oximetry   














  03/13/18 03/13/18 03/13/18





  07:15 07:30 07:45


 


Temperature   


 


Pulse Rate 82 82 81


 


Pulse Rate [   





Anterior   





Bilateral   





Throughout]   


 


Respiratory 16 16 16





Rate   


 


Respiratory   





Rate [Anterior   





Bilateral   





Throughout]   


 


Blood Pressure 100/66 104/66 106/69


 


O2 Sat by Pulse 99 99 100





Oximetry   














  03/13/18 03/13/18 03/13/18





  08:00 08:15 08:57


 


Temperature   


 


Pulse Rate 81 81 80


 


Pulse Rate [   





Anterior   





Bilateral   





Throughout]   


 


Respiratory 16 16 





Rate   


 


Respiratory   





Rate [Anterior   





Bilateral   





Throughout]   


 


Blood Pressure 97/64 101/69 102/66


 


O2 Sat by Pulse 99 99 100





Oximetry   














  03/13/18 03/13/18





  09:09 09:21


 


Temperature  


 


Pulse Rate  


 


Pulse Rate [ 80 82





Anterior  





Bilateral  





Throughout]  


 


Respiratory  





Rate  


 


Respiratory 16 17





Rate [Anterior  





Bilateral  





Throughout]  


 


Blood Pressure  


 


O2 Sat by Pulse  





Oximetry  














- General Apperance


Constitutional: other (Intubated, ventialted, no sedatives, no paralytics.)





- EENT


EENT: mucous membranes moist





- Respiratory


Respiratory: lungs clear, normal breath sounds, no respiratory distress





- Cardiovascular


Cardiovascular: regular rate, no murmurs


Extremities: no peripheral edema bilat, no clubbing, cyanosis





- Gastrointestinal


Gastrointestinal: normoactive bowel sounds, soft





- Neurologic


Blu Coma Scale (3-15): 3


Cranial nerve examination: other (Pupils 6 mm, round, equal, not reactive. Face 

symmetric. No any movement to deep pains, no myoclonus. No corneal, no gag, no 

Doll's.)


Reflexes: 0: ankle, bicep, knee, tricep





- Laboratory Findings


CBC and BMP: 


 03/12/18 17:49





 03/12/18 06:15


Abnormal Lab Findings: 


 Abnormal Labs











  03/11/18 03/11/18 03/11/18





  15:15 15:15 15:15


 


MCV  100 H  


 


Seg Neuts % (Manual)  34.0 L  


 


Lymphocytes % (Manual)  59.0 H  


 


Lymphocytes # (Manual)  6.0 H  


 


PT   18.2 H 


 


INR   1.42 H 


 


APTT   


 


D-Dimer   > 11810 H 


 


POC ABG pH   


 


POC ABG pCO2   


 


POC ABG pO2   


 


VBG pH   


 


Sodium   


 


Potassium   


 


Chloride    95.7 L


 


Carbon Dioxide    15 L


 


BUN   


 


Creatinine   


 


Glucose    480 H


 


POC Glucose   


 


Lactic Acid   


 


Calcium    7.9 L


 


AST    55 H


 


ALT    72 H


 


Total Creatine Kinase   


 


CK-MB (CK-2)   


 


Troponin T   


 


Total Protein    5.6 L


 


Albumin    3.3 L














  03/11/18 03/11/18 03/11/18





  15:15 15:15 15:15


 


MCV   


 


Seg Neuts % (Manual)   


 


Lymphocytes % (Manual)   


 


Lymphocytes # (Manual)   


 


PT   


 


INR   


 


APTT   


 


D-Dimer   


 


POC ABG pH   


 


POC ABG pCO2   


 


POC ABG pO2   


 


VBG pH   6.787 L* 


 


Sodium   


 


Potassium   


 


Chloride   


 


Carbon Dioxide   


 


BUN   


 


Creatinine   


 


Glucose   


 


POC Glucose   


 


Lactic Acid  13.90 H*  


 


Calcium   


 


AST   


 


ALT   


 


Total Creatine Kinase    170 H


 


CK-MB (CK-2)   


 


Troponin T   


 


Total Protein   


 


Albumin   














  03/11/18 03/11/18 03/11/18





  16:21 16:22 17:54


 


MCV   


 


Seg Neuts % (Manual)   


 


Lymphocytes % (Manual)   


 


Lymphocytes # (Manual)   


 


PT   


 


INR   


 


APTT   


 


D-Dimer   


 


POC ABG pH   7.161 L 


 


POC ABG pCO2   73.3 H 


 


POC ABG pO2   243 H 


 


VBG pH   


 


Sodium   


 


Potassium   


 


Chloride   


 


Carbon Dioxide   


 


BUN   


 


Creatinine   


 


Glucose   


 


POC Glucose   


 


Lactic Acid  8.30 H*   6.00 H*


 


Calcium   


 


AST   


 


ALT   


 


Total Creatine Kinase   


 


CK-MB (CK-2)   


 


Troponin T   


 


Total Protein   


 


Albumin   














  03/11/18 03/11/18 03/11/18





  17:54 18:50 21:55


 


MCV   


 


Seg Neuts % (Manual)   


 


Lymphocytes % (Manual)   


 


Lymphocytes # (Manual)   


 


PT   


 


INR   


 


APTT   


 


D-Dimer   


 


POC ABG pH   


 


POC ABG pCO2   


 


POC ABG pO2   


 


VBG pH   


 


Sodium   


 


Potassium   


 


Chloride   


 


Carbon Dioxide   


 


BUN   


 


Creatinine   


 


Glucose   


 


POC Glucose   


 


Lactic Acid   4.70 H* 


 


Calcium   


 


AST   


 


ALT   


 


Total Creatine Kinase   


 


CK-MB (CK-2)   


 


Troponin T  0.275 H* D   0.858 H* D


 


Total Protein   


 


Albumin   














  03/11/18 03/11/18 03/12/18





  21:55 22:20 01:25


 


MCV   


 


Seg Neuts % (Manual)   


 


Lymphocytes % (Manual)   


 


Lymphocytes # (Manual)   


 


PT   


 


INR   


 


APTT   


 


D-Dimer   


 


POC ABG pH   


 


POC ABG pCO2    50.0 H


 


POC ABG pO2   


 


VBG pH   


 


Sodium   


 


Potassium   


 


Chloride   


 


Carbon Dioxide   


 


BUN   


 


Creatinine   


 


Glucose   


 


POC Glucose   166 H 


 


Lactic Acid  4.40 H*  


 


Calcium   


 


AST   


 


ALT   


 


Total Creatine Kinase   


 


CK-MB (CK-2)   


 


Troponin T   


 


Total Protein   


 


Albumin   














  03/12/18 03/12/18 03/12/18





  05:33 06:15 06:15


 


MCV   


 


Seg Neuts % (Manual)   


 


Lymphocytes % (Manual)   


 


Lymphocytes # (Manual)   


 


PT   


 


INR   


 


APTT   


 


D-Dimer   


 


POC ABG pH  7.488 H  


 


POC ABG pCO2  33.1 L  


 


POC ABG pO2  173 H  


 


VBG pH   


 


Sodium   154 H D 


 


Potassium   3.5 L 


 


Chloride   113.7 H 


 


Carbon Dioxide   


 


BUN   26 H 


 


Creatinine   1.6 H 


 


Glucose   204 H 


 


POC Glucose   


 


Lactic Acid    2.70 H*


 


Calcium   7.8 L 


 


AST   206 H 


 


ALT   217 H 


 


Total Creatine Kinase   


 


CK-MB (CK-2)   


 


Troponin T   


 


Total Protein   5.9 L 


 


Albumin   3.3 L 














  03/12/18 03/12/18 03/13/18





  17:49 17:49 04:11


 


MCV   


 


Seg Neuts % (Manual)   


 


Lymphocytes % (Manual)   


 


Lymphocytes # (Manual)   


 


PT   16.8 H 


 


INR   1.29 H 


 


APTT   43.9 H 


 


D-Dimer   


 


POC ABG pH   


 


POC ABG pCO2   


 


POC ABG pO2    137 H


 


VBG pH   


 


Sodium   


 


Potassium   


 


Chloride   


 


Carbon Dioxide   


 


BUN   


 


Creatinine   


 


Glucose   


 


POC Glucose   


 


Lactic Acid   


 


Calcium   


 


AST   


 


ALT   


 


Total Creatine Kinase  2322 H  


 


CK-MB (CK-2)  10.9 H  


 


Troponin T  0.231 H* D  


 


Total Protein   


 


Albumin

## 2018-03-13 NOTE — XRAY REPORT
FINAL REPORT



PROCEDURE:  XR CHEST 1V AP



TECHNIQUE:  Chest radiograph anteroposterior view. CPT 66771







HISTORY:  follow up respiratory failure 



COMPARISON:  03/11/2018



FINDINGS:  

Heart: Normal.



Mediastinum/Vessels: Normal.



Lungs/Pleural space: Lungs are expanded. There is mild vascular

congestion. There are no active infiltrates. There is no pleural

effusion or pneumothorax..



Bony thorax: No acute osseous abnormality.



Life support devices: Endotracheal tube is in the mid trachea. NG

tube is in the stomach. There is a left-sided central venous

catheter. The tip is at the superior vena cava..



IMPRESSION:  

Heart size is normal.



Lungs are expanded. There is mild vascular congestion. There are

no active infiltrates. There is no pleural effusion or

pneumothorax..



Endotracheal tube is in the mid trachea. NG tube is in the

stomach. There is a left-sided central venous catheter. The tip

is at the superior vena cava..

## 2018-03-13 NOTE — PROGRESS NOTE
Assessment and Plan


Assessment and plan: 


Patient is a 62-year-old female past medical history of asthma COPD and 

hypertension who is presenting cardiac arrest.  Patient's family called [phone] 

because of diaphoresis and shortness of breath.  When EMS arrived they 

initiated ACLS protocol.  The patient was in PEA most of the ride to the 

emergency department.








//  Anoxic brain damage


Evident on ct head


likely from severe hypoxia from cardiac arrest


Doni flow scan today


Neurology input noted. Await family decision. 





// Cardiac arrest


Pt treated with ACLS protocol with return of perfusing rhythm, IV pressor 

support. 


cardiology consulted, preserved EF on 2d echo





// Cardiogenic shock


IV pressor support, IVF resuscitation, supportive care.





// Aspiration pneumonia


IV antibiotics, supplemental oxygen, 


CT chest showed right upper and middle love consolidation, likely from mucus 

plug vs underlying mass


will defer to pulmonary for further recommendation








//Acute Respiratory failure with hypoxia: 


Pt intubated, sedated on vent support, nebulizer therapy, supplemental oxygen, 

wean vent as tolerated, SBT daily, sedation holiday, daily ABG, Poor prognosis, 

Pulmonary consulted. 





// Metabolic acidosis


IVF resuscitation, serial bmp, monitor uop q shift, 





//elevated troponine


- likely due to cardiac arrest, cardiology consulted, started on heparin drip





// hypernatremia, start on hypotonic fluid





//OPAL, likely tubular necrosis from hypotension


- monitor renal function, start on iv fluid





// DVT prophylaxis


heparin








The high probability of a clinically significant, sudden or life threatening 

deterioration of the [pulmonary, cardiac, renal] system(s) required my full and 

direct attention, intervention and personal management. The aggregate critical 

care time was [65] minutes. This time is in addition to time spent performing 

reported procedures but includes the following: 





[x] Data Review and interpretation 





[x] Patient assessment and monitoring of vital signs 





[x] Documentation 





[x] Medication orders and management











History


Interval history: 


Patient seen and examined, remains on mechanical ventilation, no clinical 

response despite being off sedation. family at bedside








Hospitalist Physical





- Physical exam


Narrative exam: 


:


General appearance: other (intubated)


HEENT: atraumatic, constricted pupil


Neck: Positive: neck supple, trachea midline


Cardiac: Positive: Reg Rate and Rhythm, S1/S2


Lungs: Positive: Rhonchi (expiratory), Oxygen, Ventilated Respirations


Neuro: Positive: Other (unresponsive)


Skin: Positive: Clear.  Negative: Rash, Wound


Musculoskeletal: No Fluid Collection, No Pain,


Extremities: Absent: edema


Psych: unable to assess











- Constitutional


Vitals: 


 











Temp Pulse Resp BP Pulse Ox


 


 98.9 F   82   17   102/66   100 


 


 03/13/18 03:48  03/13/18 09:21  03/13/18 09:21  03/13/18 08:57  03/13/18 08:57











General appearance: Present: other (intubated)





Results





- Labs


CBC & Chem 7: 


 03/14/18 00:20





 03/14/18 00:20


Labs: 


 Laboratory Last Values











WBC  10.1 K/mm3 (4.5-11.0)   03/11/18  15:15    


 


RBC  3.95 M/mm3 (3.65-5.03)   03/11/18  15:15    


 


Hgb  13.2 gm/dl (10.1-14.3)   03/12/18  17:49    


 


Hct  40.0 % (30.3-42.9)   03/12/18  17:49    


 


MCV  100 fl (79-97)  H  03/11/18  15:15    


 


MCH  31 pg (28-32)   03/11/18  15:15    


 


MCHC  31 % (30-34)   03/11/18  15:15    


 


RDW  14.7 % (13.2-15.2)   03/11/18  15:15    


 


Plt Count  201 K/mm3 (140-440)   03/12/18  17:49    


 


Lymph % (Auto)  Np   03/11/18  15:15    


 


Mono % (Auto)  Np   03/11/18  15:15    


 


Eos % (Auto)  Np   03/11/18  15:15    


 


Lymph #  Np   03/11/18  15:15    


 


Baso #  Np   03/11/18  15:15    


 


Add Manual Diff  Complete   03/11/18  15:15    


 


Total Counted  100   03/11/18  15:15    


 


Seg Neutrophils %  Np   03/11/18  15:15    


 


Seg Neuts % (Manual)  34.0 % (40.0-70.0)  L  03/11/18  15:15    


 


Band Neutrophils %  1.0 %  03/11/18  15:15    


 


Lymphocytes % (Manual)  59.0 % (13.4-35.0)  H  03/11/18  15:15    


 


Reactive Lymphs % (Man)  0 %  03/11/18  15:15    


 


Monocytes % (Manual)  3.0 % (0.0-7.3)   03/11/18  15:15    


 


Eosinophils % (Manual)  2.0 % (0.0-4.3)   03/11/18  15:15    


 


Basophils % (Manual)  0 % (0.0-1.8)   03/11/18  15:15    


 


Metamyelocytes %  1.0 %  03/11/18  15:15    


 


Myelocytes %  0 %  03/11/18  15:15    


 


Promyelocytes %  0 %  03/11/18  15:15    


 


Blast Cells %  0 %  03/11/18  15:15    


 


Nucleated RBC %  Not Reportable   03/11/18  15:15    


 


Seg Neutrophils # Man  3.4 K/mm3 (1.8-7.7)   03/11/18  15:15    


 


Band Neutrophils #  0.1 K/mm3  03/11/18  15:15    


 


Lymphocytes # (Manual)  6.0 K/mm3 (1.2-5.4)  H  03/11/18  15:15    


 


Abs React Lymphs (Man)  0.0 K/mm3  03/11/18  15:15    


 


Monocytes # (Manual)  0.3 K/mm3 (0.0-0.8)   03/11/18  15:15    


 


Eosinophils # (Manual)  0.2 K/mm3 (0.0-0.4)   03/11/18  15:15    


 


Basophils # (Manual)  0.0 K/mm3 (0.0-0.1)   03/11/18  15:15    


 


Metamyelocytes #  0.1 K/mm3  03/11/18  15:15    


 


Myelocytes #  0.0 K/mm3  03/11/18  15:15    


 


Promyelocytes #  0.0 K/mm3  03/11/18  15:15    


 


Blast Cells #  0.0 K/mm3  03/11/18  15:15    


 


WBC Morphology  Not Reportable   03/11/18  15:15    


 


Hypersegmented Neuts  Not Reportable   03/11/18  15:15    


 


Hyposegmented Neuts  Not Reportable   03/11/18  15:15    


 


Hypogranular Neuts  Not Reportable   03/11/18  15:15    


 


Smudge Cells  Not Reportable   03/11/18  15:15    


 


Toxic Granulation  Not Reportable   03/11/18  15:15    


 


Toxic Vacuolation  Not Reportable   03/11/18  15:15    


 


Dohle Bodies  Not Reportable   03/11/18  15:15    


 


Pelger-Huet Anomaly  Not Reportable   03/11/18  15:15    


 


Bruce Rods  Not Reportable   03/11/18  15:15    


 


Platelet Estimate  Appears normal   03/11/18  15:15    


 


Clumped Platelets  Not Reportable   03/11/18  15:15    


 


Plt Clumps, EDTA  Not Reportable   03/11/18  15:15    


 


Large Platelets  Not Reportable   03/11/18  15:15    


 


Giant Platelets  Not Reportable   03/11/18  15:15    


 


Platelet Satelliting  Not Reportable   03/11/18  15:15    


 


Plt Morphology Comment  Not Reportable   03/11/18  15:15    


 


RBC Morphology  Normal   03/11/18  15:15    


 


Dimorphic RBCs  Not Reportable   03/11/18  15:15    


 


Polychromasia  Not Reportable   03/11/18  15:15    


 


Hypochromasia  Not Reportable   03/11/18  15:15    


 


Poikilocytosis  Not Reportable   03/11/18  15:15    


 


Anisocytosis  Not Reportable   03/11/18  15:15    


 


Microcytosis  Not Reportable   03/11/18  15:15    


 


Macrocytosis  Not Reportable   03/11/18  15:15    


 


Spherocytes  Not Reportable   03/11/18  15:15    


 


Pappenheimer Bodies  Not Reportable   03/11/18  15:15    


 


Sickle Cells  Not Reportable   03/11/18  15:15    


 


Target Cells  Not Reportable   03/11/18  15:15    


 


Tear Drop Cells  Not Reportable   03/11/18  15:15    


 


Ovalocytes  Not Reportable   03/11/18  15:15    


 


Helmet Cells  Not Reportable   03/11/18  15:15    


 


[place] Bodies  Not Reportable   03/11/18  15:15    


 


Cabot Rings  Not Reportable   03/11/18  15:15    


 


[place] Cells  Not Reportable   03/11/18  15:15    


 


Bite Cells  Not Reportable   03/11/18  15:15    


 


Crenated Cell  Not Reportable   03/11/18  15:15    


 


Elliptocytes  Not Reportable   03/11/18  15:15    


 


Acanthocytes (Spur)  Not Reportable   03/11/18  15:15    


 


Rouleaux  Not Reportable   03/11/18  15:15    


 


Hemoglobin C Crystals  Not Reportable   03/11/18  15:15    


 


Schistocytes  Not Reportable   03/11/18  15:15    


 


Malaria parasites  Not Reportable   03/11/18  15:15    


 


Trav Bodies  Not Reportable   03/11/18  15:15    


 


Hem Pathologist Commnt  No   03/11/18  15:15    


 


PT  16.8 Sec. (12.2-14.9)  H  03/12/18  17:49    


 


INR  1.29  (0.87-1.13)  H  03/12/18  17:49    


 


APTT  43.9 Sec. (24.2-36.6)  H  03/12/18  17:49    


 


D-Dimer  > 10284 ng/mlDDU (0-234)  H  03/11/18  15:15    


 


Heparin Anti-Xa Level  0.53 U.I./ml (0.3-0.7)   03/13/18  06:15    


 


POC ABG pH  7.401  (7.35-7.45)   03/13/18  04:11    


 


POC ABG pCO2  44.2  (35-45)   03/13/18  04:11    


 


POC ABG pO2  137  ()  H  03/13/18  04:11    


 


POC ABG HCO3  27.4   03/13/18  04:11    


 


POC ABG Total CO2  29   03/13/18  04:11    


 


POC ABG O2 Sat  99   03/13/18  04:11    


 


POC ABG Base Excess  3   03/13/18  04:11    


 


VBG pH  6.787  (7.320-7.420)  L*  03/11/18  15:15    


 


FiO2  35 %  03/13/18  04:11    


 


Sodium  154 mmol/L (137-145)  H D 03/12/18  06:15    


 


Potassium  3.5 mmol/L (3.6-5.0)  L  03/12/18  06:15    


 


Chloride  113.7 mmol/L ()  H  03/12/18  06:15    


 


Carbon Dioxide  24 mmol/L (22-30)  D 03/12/18  06:15    


 


Anion Gap  20 mmol/L  03/12/18  06:15    


 


BUN  26 mg/dL (7-17)  H  03/12/18  06:15    


 


Creatinine  1.6 mg/dL (0.7-1.2)  H  03/12/18  06:15    


 


Estimated GFR  40 ml/min  03/12/18  06:15    


 


BUN/Creatinine Ratio  16 %  03/12/18  06:15    


 


Glucose  204 mg/dL ()  H  03/12/18  06:15    


 


POC Glucose  166  ()  H  03/11/18  22:20    


 


Lactic Acid  1.70 mmol/L (0.7-2.0)   03/12/18  14:54    


 


Calcium  7.8 mg/dL (8.4-10.2)  L  03/12/18  06:15    


 


Total Bilirubin  0.60 mg/dL (0.1-1.2)   03/12/18  06:15    


 


AST  206 units/L (5-40)  H  03/12/18  06:15    


 


ALT  217 units/L (7-56)  H  03/12/18  06:15    


 


Alkaline Phosphatase  96 units/L ()   03/12/18  06:15    


 


Total Creatine Kinase  2322 units/L ()  H  03/12/18  17:49    


 


CK-MB (CK-2)  10.9 ng/mL (0.0-4.0)  H  03/12/18  17:49    


 


CK-MB (CK-2) Rel Index  0.4  (0-4)   03/12/18  17:49    


 


Troponin T  0.231 ng/mL (0.00-0.029)  H* D 03/12/18  17:49    


 


NT-Pro-B Natriuret Pep  < 5 pg/mL (0-900)   03/11/18  15:15    


 


Total Protein  5.9 g/dL (6.3-8.2)  L  03/12/18  06:15    


 


Albumin  3.3 g/dL (3.9-5)  L  03/12/18  06:15    


 


Albumin/Globulin Ratio  1.3 %  03/12/18  06:15    


 


Triglycerides  48 mg/dL (2-149)   03/11/18  17:54    


 


Cholesterol  134 mg/dL ()   03/11/18  17:54    


 


LDL Cholesterol Direct  83 mg/dL ()   03/11/18  17:54    


 


HDL Cholesterol  49 mg/dL (40-59)   03/11/18  17:54    


 


Cholesterol/HDL Ratio  2.73 %  03/11/18  17:54    


 


Urine Color  Yellow  (Yellow)   03/11/18  15:49    


 


Urine Turbidity  Clear  (Clear)   03/11/18  15:49    


 


Urine pH  6.0  (5.0-7.0)   03/11/18  15:49    


 


Ur Specific Gravity  1.021  (1.003-1.030)   03/11/18  15:49    


 


Urine Protein  <15 mg/dl mg/dL (Negative)   03/11/18  15:49    


 


Urine Glucose (UA)  Neg mg/dL (Negative)   03/11/18  15:49    


 


Urine Ketones  Neg mg/dL (Negative)   03/11/18  15:49    


 


Urine Blood  Neg  (Negative)   03/11/18  15:49    


 


Urine Nitrite  Neg  (Negative)   03/11/18  15:49    


 


Urine Bilirubin  Neg  (Negative)   03/11/18  15:49    


 


Urine Urobilinogen  4.0 mg/dL (<2.0)   03/11/18  15:49    


 


Ur Leukocyte Esterase  Neg  (Negative)   03/11/18  15:49    


 


Urine WBC (Auto)  1.0 /HPF (0.0-6.0)   03/11/18  15:49    


 


Urine RBC (Auto)  1.0 /HPF (0.0-6.0)   03/11/18  15:49    


 


U Epithel Cells (Auto)  < 1.0 /HPF (0-13.0)   03/11/18  15:49    


 


Urine Mucus  Few /HPF  03/11/18  15:49

## 2018-03-13 NOTE — PROGRESS NOTE
Assessment and Plan


Assessment:


S/p PEA arrest 


NSTEMI type I 


Abnormal ECG


Acute respiratory failure - intubated


PNA - ? aspiration


AMS - head CT c/w diffuse anoxic injury


Metabolic acidosis  


Hypernatremia


Hypotension - requiring vasopressor suppport


Elevated DDimer - chest CTA negative for PE


H/o asthma





Plan:


ECG improved this AM. Benny trending down.  


Echo reviewed - 60-65%, mild TR. 


Cont heparin gtt.


Continue supportive management. 


Consider ischemic evaluation once medically stabilized. 





The patient has been seen in conjunction with Dr. Spicer who agrees with the 

assessment and plan of care.








Subjective


Date of service: 03/13/18


Principal diagnosis: Anoxic brain injury.


Interval history: 


pt remains intubated, off sedation, nonresponsive, on vasopressors. no family 

at bedside. 








Objective





  Last Vital Signs











Temp  95.5 F L  03/13/18 08:00


 


Pulse  82   03/13/18 09:21


 


Resp  17   03/13/18 09:21


 


BP  102/66   03/13/18 08:57


 


Pulse Ox  100   03/13/18 08:57

















- Physical Examination


General: Other (intubated, nonresponsive)


HEENT: Positive: Other (fixed, dilated)


Neck: Positive: neck supple, trachea midline


Cardiac: Positive: Reg Rate and Rhythm, S1/S2


Lungs: Positive: Decreased Breath Sounds, Ventilated Respirations


Neuro: Positive: Other (unresponsive)


Skin: Positive: Clear.  Negative: Rash, Wound


Musculoskeletal: No Fluid Collection, No Pain, Normal Range of Motion


Extremities: Absent: edema





- Labs and Meds


 Cardiac Enzymes











  03/12/18 Range/Units





  17:49 


 


CK-MB (CK-2)  10.9 H  (0.0-4.0)  ng/mL








 Coagulation











  03/12/18 Range/Units





  17:49 


 


PT  16.8 H  (12.2-14.9)  Sec.


 


INR  1.29 H  (0.87-1.13)  


 


APTT  43.9 H  (24.2-36.6)  Sec.








 CBC











  03/12/18 Range/Units





  17:49 


 


Hgb  13.2  (10.1-14.3)  gm/dl


 


Hct  40.0  (30.3-42.9)  %


 


Plt Count  201  (140-440)  K/mm3














- Imaging and Cardiology


EKG: report reviewed, image reviewed


Echo: report reviewed (60-65%, mild TR)





- Telemetry


EKG Rhythm: Sinus Rhythm





- EKG


Sinus rhythms and dysrhythmias: sinus rhythm


Repolarization changes or abnormalities: ST or T wave suggestive of ischemia (

diffuse)

## 2018-03-13 NOTE — PROGRESS NOTE
Assessment and Plan


Cardiopulmonary arrest, PEA


Acute Respiratory failure with hypoxia


Aspiration pneumonia


Shock multifactorial 


Aspiration pneumonia


Metabolic acidosis, secondary to hypoperfusion


Elevated troponin


Hypernatremia, on hypotonic fluid


OPAL, likely tubular necrosis from hypotension(improving Cr)








-Continue with mechanical ventilatory support


-Supplemental oxygen to keep O2 sats>94%


-Heparin infusion


-Stein catheter in this patient critically ill patient


-Supportive care


-Vasopressor support to keep MAP>65


-Monitor and correct all electrolyte abnormality


-Empiric antibiotics 


-CTA was negative for PE but had an upper lobe opacity








The aggregate critical care time was [35] minutes. This time is in addition to 

time spent performing reported procedures but includes the following: 





[x] Data Review and interpretation 





[x] Patient assessment and monitoring of vital signs 





[x] Documentation 





[x] Medication orders and management





Family discussion


Her 2 sons, one daughter and a daughter in law were present.


The findings on nuclear medicine brain flow scans were discussed extensively 

with her.


They wanted to know if she had a heart attack or not...I did explain that it is 

a possibility but CPR, with chest compressions can also cause elevation in 

troponin and mimic a heart attack.


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


I have explained to them that I will get a second physician to confirm my 

findings. 


I spoke with the neurologist, Dr. Wade who states he will be here tomorrow 

morning for brain death determination and documentation


[facility] was notified by the nursing staff


 was offered to the patient's family














Subjective


Date of service: 03/13/18


Interval history: 





HISTORY PER MEDICAL RECORDS


The pt is a 61 YO female with a past medical history significant for asthma. 

She is previously unknown to our practice. Patient is intubated on evaluation 

and thus HPI is obtained per the chart and per patient daughter at bedside. Per 

patient daughter, patient has been feeling well and in her normal state of 

health. This past weekend, the patient drove to [place], [place], and back. The patient 

returned from [place] yesterday and arrived home without incident. Around 1PM 

yesterday, the pt c/o increased SOB and diaphoresis. The pt took several of her 

breathing treatments without relief and then suddenly became unresponsive at 

home. This was witnessed by patient's son. Per the chart, patient was in 

cardiac arrest when EMS arrived and they initiated ACLS protocol. The patient 

was in PEA most of the ride to the ED. Following [facility] arrival, pt found to have 

RUL pneumonia consistent with aspiration pneumonia. Patient admitted to ICU. CT 

head consistent with DOLORES. ECG c/w diffuse ischemia. Troponins elevated and 

trending upwards. 








Patient seen today for: s/p cardiac arrest with DOLORES on CT head, aspiration 

pneumonia, acute hypoxic respiratory failure





Seen and examined at bedside; 24-hour events reviewed; nursing and respiratory 

care staff consulted; no adverse overnight events reported to me;


Vitals, labs, medications, chart reviewed.


Discussed in interdisciplinary rounds


Has no flow on the brain flow nuclear medicine





Objective


 Vital Signs - 12hr











  03/12/18 03/12/18 03/12/18





  21:15 21:30 21:45


 


Temperature   


 


Pulse Rate 94 H 94 H 93 H


 


Pulse Rate [   





Anterior   





Bilateral   





Throughout]   


 


Respiratory 16 16 16





Rate   


 


Respiratory   





Rate [Anterior   





Bilateral   





Throughout]   


 


Blood Pressure 96/65 99/62 103/66


 


O2 Sat by Pulse 98 98 98





Oximetry   














  03/12/18 03/12/18 03/12/18





  22:00 22:15 22:30


 


Temperature   


 


Pulse Rate 92 H 93 H 93 H


 


Pulse Rate [   





Anterior   





Bilateral   





Throughout]   


 


Respiratory 15 16 14





Rate   


 


Respiratory   





Rate [Anterior   





Bilateral   





Throughout]   


 


Blood Pressure 94/67 105/66 99/69


 


O2 Sat by Pulse 98 100 98





Oximetry   














  03/12/18 03/12/18 03/12/18





  22:45 23:00 23:15


 


Temperature   


 


Pulse Rate 92 H 93 H 91 H


 


Pulse Rate [   





Anterior   





Bilateral   





Throughout]   


 


Respiratory 16 16 13





Rate   


 


Respiratory   





Rate [Anterior   





Bilateral   





Throughout]   


 


Blood Pressure 98/65 97/66 103/69


 


O2 Sat by Pulse 98 98 





Oximetry   














  03/12/18 03/12/18 03/12/18





  23:18 23:28 23:30


 


Temperature   


 


Pulse Rate 92 H 91 H 91 H


 


Pulse Rate [   





Anterior   





Bilateral   





Throughout]   


 


Respiratory  16 17





Rate   


 


Respiratory   





Rate [Anterior   





Bilateral   





Throughout]   


 


Blood Pressure 97/66 96/65 102/65


 


O2 Sat by Pulse 100 100 100





Oximetry   














  03/12/18 03/12/18 03/13/18





  23:45 23:57 00:00


 


Temperature  97.9 F 


 


Pulse Rate 92 H  92 H


 


Pulse Rate [   





Anterior   





Bilateral   





Throughout]   


 


Respiratory 12  16





Rate   


 


Respiratory   





Rate [Anterior   





Bilateral   





Throughout]   


 


Blood Pressure 99/71  99/71


 


O2 Sat by Pulse   96





Oximetry   














  03/13/18 03/13/18 03/13/18





  00:15 00:30 00:45


 


Temperature   


 


Pulse Rate 92 H 92 H 90


 


Pulse Rate [   





Anterior   





Bilateral   





Throughout]   


 


Respiratory 14 16 17





Rate   


 


Respiratory   





Rate [Anterior   





Bilateral   





Throughout]   


 


Blood Pressure 101/65 103/71 102/67


 


O2 Sat by Pulse 97 98 98





Oximetry   














  03/13/18 03/13/18 03/13/18





  01:00 01:05 01:14


 


Temperature   


 


Pulse Rate 92 H  


 


Pulse Rate [  91 H 93 H





Anterior   





Bilateral   





Throughout]   


 


Respiratory 16  





Rate   


 


Respiratory  16 16





Rate [Anterior   





Bilateral   





Throughout]   


 


Blood Pressure 95/69  


 


O2 Sat by Pulse 97  





Oximetry   














  03/13/18 03/13/18 03/13/18





  01:15 01:30 01:45


 


Temperature   


 


Pulse Rate 92 H 93 H 93 H


 


Pulse Rate [   





Anterior   





Bilateral   





Throughout]   


 


Respiratory 16 16 15





Rate   


 


Respiratory   





Rate [Anterior   





Bilateral   





Throughout]   


 


Blood Pressure 105/73 105/64 95/65


 


O2 Sat by Pulse  98 98





Oximetry   














  03/13/18 03/13/18 03/13/18





  02:00 02:15 02:30


 


Temperature   


 


Pulse Rate 93 H 92 H 93 H


 


Pulse Rate [   





Anterior   





Bilateral   





Throughout]   


 


Respiratory 15 14 12





Rate   


 


Respiratory   





Rate [Anterior   





Bilateral   





Throughout]   


 


Blood Pressure 105/68 102/64 98/63


 


O2 Sat by Pulse 97 99 





Oximetry   














  03/13/18 03/13/18 03/13/18





  02:45 03:00 03:15


 


Temperature   


 


Pulse Rate 95 H 90 89


 


Pulse Rate [   





Anterior   





Bilateral   





Throughout]   


 


Respiratory 12 16 16





Rate   


 


Respiratory   





Rate [Anterior   





Bilateral   





Throughout]   


 


Blood Pressure 101/71 85/54 92/58


 


O2 Sat by Pulse 99 99 99





Oximetry   














  03/13/18 03/13/18 03/13/18





  03:21 03:30 03:45


 


Temperature   


 


Pulse Rate 89 90 90


 


Pulse Rate [   





Anterior   





Bilateral   





Throughout]   


 


Respiratory  16 16





Rate   


 


Respiratory   





Rate [Anterior   





Bilateral   





Throughout]   


 


Blood Pressure 92/58 126/78 126/82


 


O2 Sat by Pulse 100  99





Oximetry   














  03/13/18 03/13/18 03/13/18





  03:48 04:00 04:15


 


Temperature 98.9 F  


 


Pulse Rate  89 89


 


Pulse Rate [   





Anterior   





Bilateral   





Throughout]   


 


Respiratory  16 16





Rate   


 


Respiratory   





Rate [Anterior   





Bilateral   





Throughout]   


 


Blood Pressure  128/85 143/93


 


O2 Sat by Pulse  99 99





Oximetry   














  03/13/18 03/13/18 03/13/18





  04:29 04:30 04:46


 


Temperature   


 


Pulse Rate  90 89


 


Pulse Rate [   





Anterior   





Bilateral   





Throughout]   


 


Respiratory 16 16 16





Rate   


 


Respiratory   





Rate [Anterior   





Bilateral   





Throughout]   


 


Blood Pressure  126/80 121/77


 


O2 Sat by Pulse 100 99 98





Oximetry   














  03/13/18 03/13/18 03/13/18





  05:00 05:15 05:30


 


Temperature   


 


Pulse Rate 88 88 86


 


Pulse Rate [   





Anterior   





Bilateral   





Throughout]   


 


Respiratory 16 16 16





Rate   


 


Respiratory   





Rate [Anterior   





Bilateral   





Throughout]   


 


Blood Pressure 114/77 110/74 105/66


 


O2 Sat by Pulse 98 99 98





Oximetry   














  03/13/18 03/13/18 03/13/18





  05:45 06:00 06:15


 


Temperature   


 


Pulse Rate 86 85 84


 


Pulse Rate [   





Anterior   





Bilateral   





Throughout]   


 


Respiratory 16 16 16





Rate   


 


Respiratory   





Rate [Anterior   





Bilateral   





Throughout]   


 


Blood Pressure 100/68 109/65 101/65


 


O2 Sat by Pulse 99 98 99





Oximetry   














  03/13/18 03/13/18 03/13/18





  06:30 06:45 07:00


 


Temperature   


 


Pulse Rate 84 83 83


 


Pulse Rate [   





Anterior   





Bilateral   





Throughout]   


 


Respiratory 16 16 16





Rate   


 


Respiratory   





Rate [Anterior   





Bilateral   





Throughout]   


 


Blood Pressure 97/68 101/70 101/67


 


O2 Sat by Pulse 99 99 99





Oximetry   














  03/13/18 03/13/18 03/13/18





  07:15 07:30 07:45


 


Temperature   


 


Pulse Rate 82 82 81


 


Pulse Rate [   





Anterior   





Bilateral   





Throughout]   


 


Respiratory 16 16 16





Rate   


 


Respiratory   





Rate [Anterior   





Bilateral   





Throughout]   


 


Blood Pressure 100/66 104/66 106/69


 


O2 Sat by Pulse 99 99 100





Oximetry   














  03/13/18 03/13/18





  08:00 08:15


 


Temperature  


 


Pulse Rate 81 81


 


Pulse Rate [  





Anterior  





Bilateral  





Throughout]  


 


Respiratory 16 16





Rate  


 


Respiratory  





Rate [Anterior  





Bilateral  





Throughout]  


 


Blood Pressure 97/64 101/69


 


O2 Sat by Pulse 99 99





Oximetry  











Constitutional: no acute distress, comatose, other (unresponsive, orally 

intubated to MVS)


Eyes: non-icteric, other (pupils are fixed and dilated)


ENT: oropharynx moist


Neck: supple


Effort: normal


Cardiovascular: regular rate and rhythm, murmur noted (S1,S2 no murmurs)


Gastrointestinal: normoactive bowel sounds, soft, non-tender, non-distended


Integumentary: normal


Extremities: no cyanosis, no edema, pink and warm, pulses normal


Neurologic: other (unresponsive, no gag reflex, no cough reflex)


CBC and BMP: 


 03/12/18 17:49





 03/13/18 13:12


ABG, PT/INR, D-dimer: 


ABG











POC ABG pH  7.401  (7.35-7.45)   03/13/18  04:11    


 


POC ABG pCO2  44.2  (35-45)   03/13/18  04:11    


 


POC ABG pO2  137  ()  H  03/13/18  04:11    


 


POC ABG HCO3  27.4   03/13/18  04:11    


 


POC ABG Total CO2  29   03/13/18  04:11    


 


POC ABG O2 Sat  99   03/13/18  04:11    





PT/INR, D-dimer











PT  16.8 Sec. (12.2-14.9)  H  03/12/18  17:49    


 


INR  1.29  (0.87-1.13)  H  03/12/18  17:49    


 


D-Dimer  > 74629 ng/mlDDU (0-234)  H  03/11/18  15:15    








Abnormal lab findings: 


 Abnormal Labs











  03/11/18 03/11/18 03/11/18





  15:15 15:15 15:15


 


MCV  100 H  


 


Seg Neuts % (Manual)  34.0 L  


 


Lymphocytes % (Manual)  59.0 H  


 


Lymphocytes # (Manual)  6.0 H  


 


PT   18.2 H 


 


INR   1.42 H 


 


APTT   


 


D-Dimer   > 90751 H 


 


POC ABG pH   


 


POC ABG pCO2   


 


POC ABG pO2   


 


VBG pH   


 


Sodium   


 


Potassium   


 


Chloride    95.7 L


 


Carbon Dioxide    15 L


 


BUN   


 


Creatinine   


 


Glucose    480 H


 


POC Glucose   


 


Lactic Acid   


 


Calcium    7.9 L


 


AST    55 H


 


ALT    72 H


 


Total Creatine Kinase   


 


CK-MB (CK-2)   


 


Troponin T   


 


Total Protein    5.6 L


 


Albumin    3.3 L














  03/11/18 03/11/18 03/11/18





  15:15 15:15 15:15


 


MCV   


 


Seg Neuts % (Manual)   


 


Lymphocytes % (Manual)   


 


Lymphocytes # (Manual)   


 


PT   


 


INR   


 


APTT   


 


D-Dimer   


 


POC ABG pH   


 


POC ABG pCO2   


 


POC ABG pO2   


 


VBG pH   6.787 L* 


 


Sodium   


 


Potassium   


 


Chloride   


 


Carbon Dioxide   


 


BUN   


 


Creatinine   


 


Glucose   


 


POC Glucose   


 


Lactic Acid  13.90 H*  


 


Calcium   


 


AST   


 


ALT   


 


Total Creatine Kinase    170 H


 


CK-MB (CK-2)   


 


Troponin T   


 


Total Protein   


 


Albumin   














  03/11/18 03/11/18 03/11/18





  16:21 16:22 17:54


 


MCV   


 


Seg Neuts % (Manual)   


 


Lymphocytes % (Manual)   


 


Lymphocytes # (Manual)   


 


PT   


 


INR   


 


APTT   


 


D-Dimer   


 


POC ABG pH   7.161 L 


 


POC ABG pCO2   73.3 H 


 


POC ABG pO2   243 H 


 


VBG pH   


 


Sodium   


 


Potassium   


 


Chloride   


 


Carbon Dioxide   


 


BUN   


 


Creatinine   


 


Glucose   


 


POC Glucose   


 


Lactic Acid  8.30 H*   6.00 H*


 


Calcium   


 


AST   


 


ALT   


 


Total Creatine Kinase   


 


CK-MB (CK-2)   


 


Troponin T   


 


Total Protein   


 


Albumin   














  03/11/18 03/11/18 03/11/18





  17:54 18:50 21:55


 


MCV   


 


Seg Neuts % (Manual)   


 


Lymphocytes % (Manual)   


 


Lymphocytes # (Manual)   


 


PT   


 


INR   


 


APTT   


 


D-Dimer   


 


POC ABG pH   


 


POC ABG pCO2   


 


POC ABG pO2   


 


VBG pH   


 


Sodium   


 


Potassium   


 


Chloride   


 


Carbon Dioxide   


 


BUN   


 


Creatinine   


 


Glucose   


 


POC Glucose   


 


Lactic Acid   4.70 H* 


 


Calcium   


 


AST   


 


ALT   


 


Total Creatine Kinase   


 


CK-MB (CK-2)   


 


Troponin T  0.275 H* D   0.858 H* D


 


Total Protein   


 


Albumin   














  03/11/18 03/11/18 03/12/18





  21:55 22:20 01:25


 


MCV   


 


Seg Neuts % (Manual)   


 


Lymphocytes % (Manual)   


 


Lymphocytes # (Manual)   


 


PT   


 


INR   


 


APTT   


 


D-Dimer   


 


POC ABG pH   


 


POC ABG pCO2    50.0 H


 


POC ABG pO2   


 


VBG pH   


 


Sodium   


 


Potassium   


 


Chloride   


 


Carbon Dioxide   


 


BUN   


 


Creatinine   


 


Glucose   


 


POC Glucose   166 H 


 


Lactic Acid  4.40 H*  


 


Calcium   


 


AST   


 


ALT   


 


Total Creatine Kinase   


 


CK-MB (CK-2)   


 


Troponin T   


 


Total Protein   


 


Albumin   














  03/12/18 03/12/18 03/12/18





  05:33 06:15 06:15


 


MCV   


 


Seg Neuts % (Manual)   


 


Lymphocytes % (Manual)   


 


Lymphocytes # (Manual)   


 


PT   


 


INR   


 


APTT   


 


D-Dimer   


 


POC ABG pH  7.488 H  


 


POC ABG pCO2  33.1 L  


 


POC ABG pO2  173 H  


 


VBG pH   


 


Sodium   154 H D 


 


Potassium   3.5 L 


 


Chloride   113.7 H 


 


Carbon Dioxide   


 


BUN   26 H 


 


Creatinine   1.6 H 


 


Glucose   204 H 


 


POC Glucose   


 


Lactic Acid    2.70 H*


 


Calcium   7.8 L 


 


AST   206 H 


 


ALT   217 H 


 


Total Creatine Kinase   


 


CK-MB (CK-2)   


 


Troponin T   


 


Total Protein   5.9 L 


 


Albumin   3.3 L 














  03/12/18 03/12/18 03/13/18





  17:49 17:49 04:11


 


MCV   


 


Seg Neuts % (Manual)   


 


Lymphocytes % (Manual)   


 


Lymphocytes # (Manual)   


 


PT   16.8 H 


 


INR   1.29 H 


 


APTT   43.9 H 


 


D-Dimer   


 


POC ABG pH   


 


POC ABG pCO2   


 


POC ABG pO2    137 H


 


VBG pH   


 


Sodium   


 


Potassium   


 


Chloride   


 


Carbon Dioxide   


 


BUN   


 


Creatinine   


 


Glucose   


 


POC Glucose   


 


Lactic Acid   


 


Calcium   


 


AST   


 


ALT   


 


Total Creatine Kinase  2322 H  


 


CK-MB (CK-2)  10.9 H  


 


Troponin T  0.231 H* D  


 


Total Protein   


 


Albumin   











Chest x-ray: image reviewed (hypoventilatory, ETT in position, feeding tube, 

[facility] CVC)

## 2018-03-13 NOTE — NUCLEAR MEDICINE REPORT
NUCLEAR MEDICINE BRAIN FLOW STUDY



HISTORY: Evaluate for brain death, anoxic brain injury.



FINDINGS:

CT head dated 3/11/18 was reviewed.



The images demonstrate no appreciable blood flow overlying the right 

and left cerebral hemispheres.



The images demonstrate relatively normal perfusion overlying the 

bilateral common carotid arteries and external carotid artery 

territories.



IMPRESSION: No evidence for intracranial blood flow.

## 2018-03-14 NOTE — PROGRESS NOTE
Assessment and Plan





Cardiopulmonary arrest, PEA


Acute Respiratory failure with hypoxia


Aspiration pneumonia


Shock multifactorial 


Aspiration pneumonia


Metabolic acidosis, secondary to hypoperfusion


Elevated troponin


Hypernatremia, on hypotonic fluid


OPAL, likely tubular necrosis from hypotension(improving Cr)





(Brain death documented and tentative withdrawal at 5pm today; for now)





- Continue with mechanical ventilatory support


- continue supplemental oxygen to keep O2 sats>94%


- continue heparin infusion


- continue ervin catheter in this patient critically ill patient


- continue supportive care


- continue vasopressor support to keep MAP>65


- Monitor and correct all electrolyte abnormality


- Empiric antibiotics 





....mother at bedside and condolences offered / comforted





... 35' CCT





Subjective


Date of service: 03/14/18


Principal diagnosis: Anoxic brain injury, brain death.


Interval history: 





Patient seen today for: Acute hypoxic respiratory failure, Acute Encephalopathy 

with brain death 





Seen and examined at bedside; 24-hour events reviewed; nursing and respiratory 

care staff consulted; no adverse overnight events reported to me; remains on MVS

; s/p NM brain flow without significant intracranial flow; family informed of 

clinical and radiographic documentation and have requested time for zenon 

family to be present before withdrawal of vasopressors and MVS; life-link 

evaluated also





Objective


 Vital Signs - 12hr











  03/14/18 03/14/18 03/14/18





  01:00 01:15 01:27


 


Temperature   


 


Pulse Rate 86 87 


 


Pulse Rate [   86





Anterior   





Bilateral   





Throughout]   


 


Respiratory 16 16 





Rate   


 


Respiratory   16





Rate [Anterior   





Bilateral   





Throughout]   


 


Blood Pressure 133/81 133/81 


 


O2 Sat by Pulse 97 99 





Oximetry   














  03/14/18 03/14/18 03/14/18





  01:30 01:42 01:45


 


Temperature   


 


Pulse Rate 86  87


 


Pulse Rate [  88 





Anterior   





Bilateral   





Throughout]   


 


Respiratory 16  16





Rate   


 


Respiratory  16 





Rate [Anterior   





Bilateral   





Throughout]   


 


Blood Pressure 126/72  126/72


 


O2 Sat by Pulse 97  99





Oximetry   














  03/14/18 03/14/18 03/14/18





  02:00 02:15 02:30


 


Temperature   


 


Pulse Rate 88 88 88


 


Pulse Rate [   





Anterior   





Bilateral   





Throughout]   


 


Respiratory 16 16 16





Rate   


 


Respiratory   





Rate [Anterior   





Bilateral   





Throughout]   


 


Blood Pressure 122/73 120/74 131/77


 


O2 Sat by Pulse 97 97 97





Oximetry   














  03/14/18 03/14/18 03/14/18





  02:45 03:00 03:15


 


Temperature   


 


Pulse Rate 88 87 87


 


Pulse Rate [   





Anterior   





Bilateral   





Throughout]   


 


Respiratory 16 16 16





Rate   


 


Respiratory   





Rate [Anterior   





Bilateral   





Throughout]   


 


Blood Pressure 131/77 116/70 122/78


 


O2 Sat by Pulse 99 97 





Oximetry   














  03/14/18 03/14/18 03/14/18





  03:30 03:45 03:59


 


Temperature   


 


Pulse Rate 87 88 87


 


Pulse Rate [   





Anterior   





Bilateral   





Throughout]   


 


Respiratory 16 16 





Rate   


 


Respiratory   





Rate [Anterior   





Bilateral   





Throughout]   


 


Blood Pressure 123/72 125/75 105/60


 


O2 Sat by Pulse 97 97 99





Oximetry   














  03/14/18 03/14/18 03/14/18





  04:00 04:15 04:30


 


Temperature 96.5 F L  


 


Pulse Rate 87 87 86


 


Pulse Rate [   





Anterior   





Bilateral   





Throughout]   


 


Respiratory 16 16 16





Rate   


 


Respiratory   





Rate [Anterior   





Bilateral   





Throughout]   


 


Blood Pressure 136/80 124/73 126/74


 


O2 Sat by Pulse 99 98 98





Oximetry   














  03/14/18 03/14/18 03/14/18





  04:45 05:00 05:15


 


Temperature   


 


Pulse Rate 87 86 87


 


Pulse Rate [   





Anterior   





Bilateral   





Throughout]   


 


Respiratory 16 16 16





Rate   


 


Respiratory   





Rate [Anterior   





Bilateral   





Throughout]   


 


Blood Pressure 126/75 123/74 125/74


 


O2 Sat by Pulse 98 98 98





Oximetry   














  03/14/18 03/14/18 03/14/18





  05:30 05:46 06:00


 


Temperature   


 


Pulse Rate 86 86 87


 


Pulse Rate [   





Anterior   





Bilateral   





Throughout]   


 


Respiratory 16 16 16





Rate   


 


Respiratory   





Rate [Anterior   





Bilateral   





Throughout]   


 


Blood Pressure 123/72 106/58 124/74


 


O2 Sat by Pulse 98 97 96





Oximetry   














  03/14/18 03/14/18 03/14/18





  06:16 06:30 06:45


 


Temperature   


 


Pulse Rate 88 86 85


 


Pulse Rate [   





Anterior   





Bilateral   





Throughout]   


 


Respiratory 16 16 16





Rate   


 


Respiratory   





Rate [Anterior   





Bilateral   





Throughout]   


 


Blood Pressure 124/74 119/68 114/67


 


O2 Sat by Pulse 97 97 96





Oximetry   














  03/14/18 03/14/18 03/14/18





  07:00 07:15 07:30


 


Temperature   


 


Pulse Rate 86 85 84


 


Pulse Rate [   





Anterior   





Bilateral   





Throughout]   


 


Respiratory 16 16 16





Rate   


 


Respiratory   





Rate [Anterior   





Bilateral   





Throughout]   


 


Blood Pressure 118/68 121/70 112/61


 


O2 Sat by Pulse 97 98 97





Oximetry   














  03/14/18 03/14/18 03/14/18





  07:45 07:50 08:00


 


Temperature   97.3 F L


 


Pulse Rate 84  84


 


Pulse Rate [   





Anterior   





Bilateral   





Throughout]   


 


Respiratory 16 16 16





Rate   


 


Respiratory   





Rate [Anterior   





Bilateral   





Throughout]   


 


Blood Pressure 115/69  114/73


 


O2 Sat by Pulse 98 95 97





Oximetry   














  03/14/18 03/14/18 03/14/18





  08:15 08:30 08:45


 


Temperature   


 


Pulse Rate 83 83 83


 


Pulse Rate [   





Anterior   





Bilateral   





Throughout]   


 


Respiratory 16 16 16





Rate   


 


Respiratory   





Rate [Anterior   





Bilateral   





Throughout]   


 


Blood Pressure 114/71 121/73 118/71


 


O2 Sat by Pulse 97 97 98





Oximetry   














  03/14/18 03/14/18 03/14/18





  08:53 08:55 09:00


 


Temperature   


 


Pulse Rate 82  82


 


Pulse Rate [  82 





Anterior   





Bilateral   





Throughout]   


 


Respiratory   16





Rate   


 


Respiratory  16 





Rate [Anterior   





Bilateral   





Throughout]   


 


Blood Pressure 118/71  122/74


 


O2 Sat by Pulse 99  97





Oximetry   














  03/14/18 03/14/18 03/14/18





  09:08 09:15 09:30


 


Temperature   


 


Pulse Rate  82 82


 


Pulse Rate [ 82  





Anterior   





Bilateral   





Throughout]   


 


Respiratory  16 16





Rate   


 


Respiratory 16  





Rate [Anterior   





Bilateral   





Throughout]   


 


Blood Pressure  113/71 115/76


 


O2 Sat by Pulse  98 98





Oximetry   














  03/14/18 03/14/18 03/14/18





  09:45 10:00 10:05


 


Temperature   


 


Pulse Rate 82 82 


 


Pulse Rate [   





Anterior   





Bilateral   





Throughout]   


 


Respiratory 16 16 16





Rate   


 


Respiratory   





Rate [Anterior   





Bilateral   





Throughout]   


 


Blood Pressure 120/73 122/77 


 


O2 Sat by Pulse 98 99 99





Oximetry   














  03/14/18 03/14/18





  11:45 11:54


 


Temperature  97.4 F L


 


Pulse Rate 79 


 


Pulse Rate [  





Anterior  





Bilateral  





Throughout]  


 


Respiratory  





Rate  


 


Respiratory  





Rate [Anterior  





Bilateral  





Throughout]  


 


Blood Pressure 112/71 


 


O2 Sat by Pulse 97 





Oximetry  











Constitutional: no acute distress, comatose, other (unresponsive, orally 

intubated to MVS)


Eyes: non-icteric, other (pupils are fixed and dilated)


ENT: oropharynx moist


Neck: supple, no lymphadenopathy


Effort: other (ventilator driven)


Ascultation: Bilateral: rhonchi


Percussion: Bilateral: not dull


Cardiovascular: regular rate and rhythm, murmur noted (S1,S2 no murmurs)


Gastrointestinal: normoactive bowel sounds, soft, non-tender, non-distended


Integumentary: normal


Extremities: no cyanosis, no edema, pink and warm, pulses normal


Neurologic: other (unresponsive, no gag reflex, no cough reflex)


Psychiatric: other (unable to assess)


CBC and BMP: 


 03/14/18 05:05





 03/14/18 05:05


ABG, PT/INR, D-dimer: 


ABG











POC ABG pH  7.345  (7.35-7.45)  L  03/14/18  04:04    


 


POC ABG pCO2  39.1  (35-45)   03/14/18  04:04    


 


POC ABG pO2  94  ()   03/14/18  04:04    


 


POC ABG HCO3  21.3   03/14/18  04:04    


 


POC ABG Total CO2  22   03/14/18  04:04    


 


POC ABG O2 Sat  97   03/14/18  04:04    





PT/INR, D-dimer











PT  16.8 Sec. (12.2-14.9)  H  03/12/18  17:49    


 


INR  1.29  (0.87-1.13)  H  03/12/18  17:49    


 


D-Dimer  > 92155 ng/mlDDU (0-234)  H  03/11/18  15:15    








Abnormal lab findings: 


 Abnormal Labs











  03/11/18 03/11/18 03/11/18





  15:15 15:15 15:15


 


MCV  100 H  


 


Lymph % (Auto)   


 


Eos % (Auto)   


 


Lymph #   


 


Eos #   


 


Seg Neutrophils %   


 


Seg Neuts % (Manual)  34.0 L  


 


Lymphocytes % (Manual)  59.0 H  


 


Lymphocytes # (Manual)  6.0 H  


 


PT   18.2 H 


 


INR   1.42 H 


 


APTT   


 


D-Dimer   > 03146 H 


 


POC ABG pH   


 


POC ABG pCO2   


 


POC ABG pO2   


 


VBG pH   


 


Sodium   


 


Potassium   


 


Chloride    95.7 L


 


Carbon Dioxide    15 L


 


BUN   


 


Creatinine   


 


Glucose    480 H


 


POC Glucose   


 


Lactic Acid   


 


Calcium    7.9 L


 


AST    55 H


 


ALT    72 H


 


Total Creatine Kinase   


 


CK-MB (CK-2)   


 


Troponin T   


 


Total Protein    5.6 L


 


Albumin    3.3 L














  03/11/18 03/11/18 03/11/18





  15:15 15:15 15:15


 


MCV   


 


Lymph % (Auto)   


 


Eos % (Auto)   


 


Lymph #   


 


Eos #   


 


Seg Neutrophils %   


 


Seg Neuts % (Manual)   


 


Lymphocytes % (Manual)   


 


Lymphocytes # (Manual)   


 


PT   


 


INR   


 


APTT   


 


D-Dimer   


 


POC ABG pH   


 


POC ABG pCO2   


 


POC ABG pO2   


 


VBG pH   6.787 L* 


 


Sodium   


 


Potassium   


 


Chloride   


 


Carbon Dioxide   


 


BUN   


 


Creatinine   


 


Glucose   


 


POC Glucose   


 


Lactic Acid  13.90 H*  


 


Calcium   


 


AST   


 


ALT   


 


Total Creatine Kinase    170 H


 


CK-MB (CK-2)   


 


Troponin T   


 


Total Protein   


 


Albumin   














  03/11/18 03/11/18 03/11/18





  16:21 16:22 17:54


 


MCV   


 


Lymph % (Auto)   


 


Eos % (Auto)   


 


Lymph #   


 


Eos #   


 


Seg Neutrophils %   


 


Seg Neuts % (Manual)   


 


Lymphocytes % (Manual)   


 


Lymphocytes # (Manual)   


 


PT   


 


INR   


 


APTT   


 


D-Dimer   


 


POC ABG pH   7.161 L 


 


POC ABG pCO2   73.3 H 


 


POC ABG pO2   243 H 


 


VBG pH   


 


Sodium   


 


Potassium   


 


Chloride   


 


Carbon Dioxide   


 


BUN   


 


Creatinine   


 


Glucose   


 


POC Glucose   


 


Lactic Acid  8.30 H*   6.00 H*


 


Calcium   


 


AST   


 


ALT   


 


Total Creatine Kinase   


 


CK-MB (CK-2)   


 


Troponin T   


 


Total Protein   


 


Albumin   














  03/11/18 03/11/18 03/11/18





  17:54 18:50 21:55


 


MCV   


 


Lymph % (Auto)   


 


Eos % (Auto)   


 


Lymph #   


 


Eos #   


 


Seg Neutrophils %   


 


Seg Neuts % (Manual)   


 


Lymphocytes % (Manual)   


 


Lymphocytes # (Manual)   


 


PT   


 


INR   


 


APTT   


 


D-Dimer   


 


POC ABG pH   


 


POC ABG pCO2   


 


POC ABG pO2   


 


VBG pH   


 


Sodium   


 


Potassium   


 


Chloride   


 


Carbon Dioxide   


 


BUN   


 


Creatinine   


 


Glucose   


 


POC Glucose   


 


Lactic Acid   4.70 H* 


 


Calcium   


 


AST   


 


ALT   


 


Total Creatine Kinase   


 


CK-MB (CK-2)   


 


Troponin T  0.275 H* D   0.858 H* D


 


Total Protein   


 


Albumin   














  03/11/18 03/11/18 03/12/18





  21:55 22:20 01:25


 


MCV   


 


Lymph % (Auto)   


 


Eos % (Auto)   


 


Lymph #   


 


Eos #   


 


Seg Neutrophils %   


 


Seg Neuts % (Manual)   


 


Lymphocytes % (Manual)   


 


Lymphocytes # (Manual)   


 


PT   


 


INR   


 


APTT   


 


D-Dimer   


 


POC ABG pH   


 


POC ABG pCO2    50.0 H


 


POC ABG pO2   


 


VBG pH   


 


Sodium   


 


Potassium   


 


Chloride   


 


Carbon Dioxide   


 


BUN   


 


Creatinine   


 


Glucose   


 


POC Glucose   166 H 


 


Lactic Acid  4.40 H*  


 


Calcium   


 


AST   


 


ALT   


 


Total Creatine Kinase   


 


CK-MB (CK-2)   


 


Troponin T   


 


Total Protein   


 


Albumin   














  03/12/18 03/12/18 03/12/18





  05:33 06:15 06:15


 


MCV   


 


Lymph % (Auto)   


 


Eos % (Auto)   


 


Lymph #   


 


Eos #   


 


Seg Neutrophils %   


 


Seg Neuts % (Manual)   


 


Lymphocytes % (Manual)   


 


Lymphocytes # (Manual)   


 


PT   


 


INR   


 


APTT   


 


D-Dimer   


 


POC ABG pH  7.488 H  


 


POC ABG pCO2  33.1 L  


 


POC ABG pO2  173 H  


 


VBG pH   


 


Sodium   154 H D 


 


Potassium   3.5 L 


 


Chloride   113.7 H 


 


Carbon Dioxide   


 


BUN   26 H 


 


Creatinine   1.6 H 


 


Glucose   204 H 


 


POC Glucose   


 


Lactic Acid    2.70 H*


 


Calcium   7.8 L 


 


AST   206 H 


 


ALT   217 H 


 


Total Creatine Kinase   


 


CK-MB (CK-2)   


 


Troponin T   


 


Total Protein   5.9 L 


 


Albumin   3.3 L 














  03/12/18 03/12/18 03/13/18





  17:49 17:49 04:11


 


MCV   


 


Lymph % (Auto)   


 


Eos % (Auto)   


 


Lymph #   


 


Eos #   


 


Seg Neutrophils %   


 


Seg Neuts % (Manual)   


 


Lymphocytes % (Manual)   


 


Lymphocytes # (Manual)   


 


PT   16.8 H 


 


INR   1.29 H 


 


APTT   43.9 H 


 


D-Dimer   


 


POC ABG pH   


 


POC ABG pCO2   


 


POC ABG pO2    137 H


 


VBG pH   


 


Sodium   


 


Potassium   


 


Chloride   


 


Carbon Dioxide   


 


BUN   


 


Creatinine   


 


Glucose   


 


POC Glucose   


 


Lactic Acid   


 


Calcium   


 


AST   


 


ALT   


 


Total Creatine Kinase  2322 H  


 


CK-MB (CK-2)  10.9 H  


 


Troponin T  0.231 H* D  


 


Total Protein   


 


Albumin   














  03/13/18 03/13/18 03/14/18





  13:12 23:28 00:20


 


MCV   


 


Lymph % (Auto)    10.8 L


 


Eos % (Auto)    8.4 H


 


Lymph #    1.0 L


 


Eos #    0.8 H


 


Seg Neutrophils %    73.7 H


 


Seg Neuts % (Manual)   


 


Lymphocytes % (Manual)   


 


Lymphocytes # (Manual)   


 


PT   


 


INR   


 


APTT   


 


D-Dimer   


 


POC ABG pH   


 


POC ABG pCO2   


 


POC ABG pO2   


 


VBG pH   


 


Sodium  148 H  


 


Potassium  3.4 L  


 


Chloride  112.0 H  


 


Carbon Dioxide   


 


BUN  23 H  


 


Creatinine  1.3 H  


 


Glucose  153 H  


 


POC Glucose   170 H 


 


Lactic Acid   


 


Calcium  7.8 L  


 


AST  103 H  


 


ALT  145 H  


 


Total Creatine Kinase   


 


CK-MB (CK-2)   


 


Troponin T   


 


Total Protein  5.6 L  


 


Albumin  2.8 L  














  03/14/18 03/14/18 03/14/18





  00:20 04:04 05:05


 


MCV   


 


Lymph % (Auto)   


 


Eos % (Auto)   


 


Lymph #   


 


Eos #   


 


Seg Neutrophils %   


 


Seg Neuts % (Manual)   


 


Lymphocytes % (Manual)   


 


Lymphocytes # (Manual)   


 


PT   


 


INR   


 


APTT   


 


D-Dimer   


 


POC ABG pH   7.345 L 


 


POC ABG pCO2   


 


POC ABG pO2   


 


VBG pH   


 


Sodium  147 H   147 H


 


Potassium  3.5 L   3.5 L


 


Chloride  113.2 H   114.0 H


 


Carbon Dioxide  21 L   20 L


 


BUN  19 H   18 H


 


Creatinine   


 


Glucose  166 H   175 H


 


POC Glucose   


 


Lactic Acid   


 


Calcium  8.0 L   8.3 L


 


AST  78 H   72 H


 


ALT  138 H   130 H


 


Total Creatine Kinase   


 


CK-MB (CK-2)   


 


Troponin T   


 


Total Protein  5.1 L   5.6 L


 


Albumin  2.9 L   2.8 L














  03/14/18 03/14/18





  05:46 11:37


 


MCV  


 


Lymph % (Auto)  


 


Eos % (Auto)  


 


Lymph #  


 


Eos #  


 


Seg Neutrophils %  


 


Seg Neuts % (Manual)  


 


Lymphocytes % (Manual)  


 


Lymphocytes # (Manual)  


 


PT  


 


INR  


 


APTT  


 


D-Dimer  


 


POC ABG pH  


 


POC ABG pCO2  


 


POC ABG pO2  


 


VBG pH  


 


Sodium  


 


Potassium  


 


Chloride  


 


Carbon Dioxide  


 


BUN  


 


Creatinine  


 


Glucose  


 


POC Glucose  190 H  164 H


 


Lactic Acid  


 


Calcium  


 


AST  


 


ALT  


 


Total Creatine Kinase  


 


CK-MB (CK-2)  


 


Troponin T  


 


Total Protein  


 


Albumin  











Chest x-ray: image reviewed

## 2018-03-14 NOTE — XRAY REPORT
FINAL REPORT



PROCEDURE:  XR CHEST 1V AP



TECHNIQUE:  Chest radiograph anteroposterior view. CPT 24906







HISTORY:  follow up respiratory failure 



COMPARISON:  No prior studies are available for comparison.



FINDINGS:  

Heart: Normal.



Mediastinum/Vessels: Normal.



Lungs/Pleural space: There are right perihilar infiltrates. The

lungs are well-expanded. There are no pneumothoraces..



Bony thorax: No acute osseous abnormality.



Life support devices: The endotracheal tube is in the mid

trachea. The NG tube is in the stomach. There is a left-sided

central venous catheter. The tip is at the left innominate

vein/SVC junction..



IMPRESSION:  

The heart size is normal..



There are right perihilar infiltrates. The lungs are

well-expanded. There are no pneumothoraces..



The endotracheal tube is in the mid trachea. The NG tube is in

the stomach. There is a left-sided central venous catheter. The

tip is at the left innominate vein/SVC junction..

## 2018-03-14 NOTE — PROGRESS NOTE
Assessment and Plan


1. Cardiac respiratory arrest. ICU team.


2. Brain death. If needed, can perform apnea test.


3. Plan discussed with her ICU physician and her cousin.


4. Suggest discussing with her family, organ donation or withdrawal life 

support.


5. Will follow up with you PRN.





Subjective


Date of service: 03/14/18 (No significant changes since yesterday.)


Principal diagnosis: Anoxic brain injury, brain death.


Interval history: 


No significant changes since yesterday





Objective





- Vital Sign


 Vital Signs - 12hr











  03/13/18 03/13/18 03/13/18





  22:30 22:45 23:00


 


Temperature   


 


Pulse Rate 83 84 83


 


Pulse Rate [   





Anterior   





Bilateral   





Throughout]   


 


Respiratory 16 16 16





Rate   


 


Respiratory   





Rate [Anterior   





Bilateral   





Throughout]   


 


Blood Pressure 112/69 110/74 116/71


 


O2 Sat by Pulse 97 98 98





Oximetry   














  03/13/18 03/13/18 03/13/18





  23:15 23:25 23:30


 


Temperature   


 


Pulse Rate 83 83 83


 


Pulse Rate [   





Anterior   





Bilateral   





Throughout]   


 


Respiratory 16  16





Rate   


 


Respiratory   





Rate [Anterior   





Bilateral   





Throughout]   


 


Blood Pressure 113/76 100/58 111/73


 


O2 Sat by Pulse 98 99 98





Oximetry   














  03/13/18 03/14/18 03/14/18





  23:45 00:00 00:15


 


Temperature  96.4 F L 


 


Pulse Rate 82 82 86


 


Pulse Rate [   





Anterior   





Bilateral   





Throughout]   


 


Respiratory 16 16 16





Rate   


 


Respiratory   





Rate [Anterior   





Bilateral   





Throughout]   


 


Blood Pressure 111/74 113/75 96/57


 


O2 Sat by Pulse 98 99 97





Oximetry   














  03/14/18 03/14/18 03/14/18





  00:30 00:45 01:00


 


Temperature   


 


Pulse Rate 86 85 86


 


Pulse Rate [   





Anterior   





Bilateral   





Throughout]   


 


Respiratory 16 16 16





Rate   


 


Respiratory   





Rate [Anterior   





Bilateral   





Throughout]   


 


Blood Pressure 137/81 137/81 133/81


 


O2 Sat by Pulse 98 100 97





Oximetry   














  03/14/18 03/14/18 03/14/18





  01:15 01:27 01:30


 


Temperature   


 


Pulse Rate 87  86


 


Pulse Rate [  86 





Anterior   





Bilateral   





Throughout]   


 


Respiratory 16  16





Rate   


 


Respiratory  16 





Rate [Anterior   





Bilateral   





Throughout]   


 


Blood Pressure 133/81  126/72


 


O2 Sat by Pulse 99  97





Oximetry   














  03/14/18 03/14/18 03/14/18





  01:42 01:45 02:00


 


Temperature   


 


Pulse Rate  87 88


 


Pulse Rate [ 88  





Anterior   





Bilateral   





Throughout]   


 


Respiratory  16 16





Rate   


 


Respiratory 16  





Rate [Anterior   





Bilateral   





Throughout]   


 


Blood Pressure  126/72 122/73


 


O2 Sat by Pulse  99 97





Oximetry   














  03/14/18 03/14/18 03/14/18





  02:15 02:30 02:45


 


Temperature   


 


Pulse Rate 88 88 88


 


Pulse Rate [   





Anterior   





Bilateral   





Throughout]   


 


Respiratory 16 16 16





Rate   


 


Respiratory   





Rate [Anterior   





Bilateral   





Throughout]   


 


Blood Pressure 120/74 131/77 131/77


 


O2 Sat by Pulse 97 97 99





Oximetry   














  03/14/18 03/14/18 03/14/18





  03:00 03:15 03:30


 


Temperature   


 


Pulse Rate 87 87 87


 


Pulse Rate [   





Anterior   





Bilateral   





Throughout]   


 


Respiratory 16 16 16





Rate   


 


Respiratory   





Rate [Anterior   





Bilateral   





Throughout]   


 


Blood Pressure 116/70 122/78 123/72


 


O2 Sat by Pulse 97  97





Oximetry   














  03/14/18 03/14/18 03/14/18





  03:45 03:59 04:00


 


Temperature   96.5 F L


 


Pulse Rate 88 87 87


 


Pulse Rate [   





Anterior   





Bilateral   





Throughout]   


 


Respiratory 16  16





Rate   


 


Respiratory   





Rate [Anterior   





Bilateral   





Throughout]   


 


Blood Pressure 125/75 105/60 136/80


 


O2 Sat by Pulse 97 99 99





Oximetry   














  03/14/18 03/14/18 03/14/18





  04:15 04:30 04:45


 


Temperature   


 


Pulse Rate 87 86 87


 


Pulse Rate [   





Anterior   





Bilateral   





Throughout]   


 


Respiratory 16 16 16





Rate   


 


Respiratory   





Rate [Anterior   





Bilateral   





Throughout]   


 


Blood Pressure 124/73 126/74 126/75


 


O2 Sat by Pulse 98 98 98





Oximetry   














  03/14/18 03/14/18 03/14/18





  05:00 05:15 05:30


 


Temperature   


 


Pulse Rate 86 87 86


 


Pulse Rate [   





Anterior   





Bilateral   





Throughout]   


 


Respiratory 16 16 16





Rate   


 


Respiratory   





Rate [Anterior   





Bilateral   





Throughout]   


 


Blood Pressure 123/74 125/74 123/72


 


O2 Sat by Pulse 98 98 98





Oximetry   














  03/14/18 03/14/18 03/14/18





  05:46 06:00 06:16


 


Temperature   


 


Pulse Rate 86 87 88


 


Pulse Rate [   





Anterior   





Bilateral   





Throughout]   


 


Respiratory 16 16 16





Rate   


 


Respiratory   





Rate [Anterior   





Bilateral   





Throughout]   


 


Blood Pressure 106/58 124/74 124/74


 


O2 Sat by Pulse 97 96 97





Oximetry   














  03/14/18 03/14/18 03/14/18





  06:30 06:45 07:00


 


Temperature   


 


Pulse Rate 86 85 86


 


Pulse Rate [   





Anterior   





Bilateral   





Throughout]   


 


Respiratory 16 16 16





Rate   


 


Respiratory   





Rate [Anterior   





Bilateral   





Throughout]   


 


Blood Pressure 119/68 114/67 118/68


 


O2 Sat by Pulse 97 96 97





Oximetry   














  03/14/18 03/14/18 03/14/18





  07:15 07:30 07:45


 


Temperature   


 


Pulse Rate 85 84 84


 


Pulse Rate [   





Anterior   





Bilateral   





Throughout]   


 


Respiratory 16 16 16





Rate   


 


Respiratory   





Rate [Anterior   





Bilateral   





Throughout]   


 


Blood Pressure 121/70 112/61 115/69


 


O2 Sat by Pulse 98 97 98





Oximetry   














  03/14/18 03/14/18 03/14/18





  08:00 08:15 08:30


 


Temperature 97.3 F L  


 


Pulse Rate 84 83 83


 


Pulse Rate [   





Anterior   





Bilateral   





Throughout]   


 


Respiratory 16 16 16





Rate   


 


Respiratory   





Rate [Anterior   





Bilateral   





Throughout]   


 


Blood Pressure 114/73 114/71 121/73


 


O2 Sat by Pulse 97 97 97





Oximetry   














  03/14/18 03/14/18 03/14/18





  08:45 08:53 08:55


 


Temperature   


 


Pulse Rate 83 82 


 


Pulse Rate [   82





Anterior   





Bilateral   





Throughout]   


 


Respiratory 16  





Rate   


 


Respiratory   16





Rate [Anterior   





Bilateral   





Throughout]   


 


Blood Pressure 118/71 118/71 


 


O2 Sat by Pulse 98 99 





Oximetry   














  03/14/18 03/14/18 03/14/18





  09:00 09:08 09:15


 


Temperature   


 


Pulse Rate 82  82


 


Pulse Rate [  82 





Anterior   





Bilateral   





Throughout]   


 


Respiratory 16  16





Rate   


 


Respiratory  16 





Rate [Anterior   





Bilateral   





Throughout]   


 


Blood Pressure 122/74  113/71


 


O2 Sat by Pulse 97  98





Oximetry   














  03/14/18 03/14/18 03/14/18





  09:30 09:45 10:00


 


Temperature   


 


Pulse Rate 82 82 82


 


Pulse Rate [   





Anterior   





Bilateral   





Throughout]   


 


Respiratory 16 16 16





Rate   


 


Respiratory   





Rate [Anterior   





Bilateral   





Throughout]   


 


Blood Pressure 115/76 120/73 122/77


 


O2 Sat by Pulse 98 98 99





Oximetry   














- Respiratory


Respiratory: lungs clear





- Cardiovascular


Cardiovascular: regular rate, no murmurs





- Neurologic


Blu Coma Scale (3-15): 3 (Per her clinical examination, consistent with 

brain death.)





- Laboratory Findings


CBC and BMP: 


 03/14/18 05:05





 03/14/18 05:05


Abnormal Lab Findings: 


 Abnormal Labs











  03/11/18 03/11/18 03/11/18





  15:15 15:15 15:15


 


MCV  100 H  


 


Lymph % (Auto)   


 


Eos % (Auto)   


 


Lymph #   


 


Eos #   


 


Seg Neutrophils %   


 


Seg Neuts % (Manual)  34.0 L  


 


Lymphocytes % (Manual)  59.0 H  


 


Lymphocytes # (Manual)  6.0 H  


 


PT   18.2 H 


 


INR   1.42 H 


 


APTT   


 


D-Dimer   > 74502 H 


 


POC ABG pH   


 


POC ABG pCO2   


 


POC ABG pO2   


 


VBG pH   


 


Sodium   


 


Potassium   


 


Chloride    95.7 L


 


Carbon Dioxide    15 L


 


BUN   


 


Creatinine   


 


Glucose    480 H


 


POC Glucose   


 


Lactic Acid   


 


Calcium    7.9 L


 


AST    55 H


 


ALT    72 H


 


Total Creatine Kinase   


 


CK-MB (CK-2)   


 


Troponin T   


 


Total Protein    5.6 L


 


Albumin    3.3 L














  03/11/18 03/11/18 03/11/18





  15:15 15:15 15:15


 


MCV   


 


Lymph % (Auto)   


 


Eos % (Auto)   


 


Lymph #   


 


Eos #   


 


Seg Neutrophils %   


 


Seg Neuts % (Manual)   


 


Lymphocytes % (Manual)   


 


Lymphocytes # (Manual)   


 


PT   


 


INR   


 


APTT   


 


D-Dimer   


 


POC ABG pH   


 


POC ABG pCO2   


 


POC ABG pO2   


 


VBG pH   6.787 L* 


 


Sodium   


 


Potassium   


 


Chloride   


 


Carbon Dioxide   


 


BUN   


 


Creatinine   


 


Glucose   


 


POC Glucose   


 


Lactic Acid  13.90 H*  


 


Calcium   


 


AST   


 


ALT   


 


Total Creatine Kinase    170 H


 


CK-MB (CK-2)   


 


Troponin T   


 


Total Protein   


 


Albumin   














  03/11/18 03/11/18 03/11/18





  16:21 16:22 17:54


 


MCV   


 


Lymph % (Auto)   


 


Eos % (Auto)   


 


Lymph #   


 


Eos #   


 


Seg Neutrophils %   


 


Seg Neuts % (Manual)   


 


Lymphocytes % (Manual)   


 


Lymphocytes # (Manual)   


 


PT   


 


INR   


 


APTT   


 


D-Dimer   


 


POC ABG pH   7.161 L 


 


POC ABG pCO2   73.3 H 


 


POC ABG pO2   243 H 


 


VBG pH   


 


Sodium   


 


Potassium   


 


Chloride   


 


Carbon Dioxide   


 


BUN   


 


Creatinine   


 


Glucose   


 


POC Glucose   


 


Lactic Acid  8.30 H*   6.00 H*


 


Calcium   


 


AST   


 


ALT   


 


Total Creatine Kinase   


 


CK-MB (CK-2)   


 


Troponin T   


 


Total Protein   


 


Albumin   














  03/11/18 03/11/18 03/11/18





  17:54 18:50 21:55


 


MCV   


 


Lymph % (Auto)   


 


Eos % (Auto)   


 


Lymph #   


 


Eos #   


 


Seg Neutrophils %   


 


Seg Neuts % (Manual)   


 


Lymphocytes % (Manual)   


 


Lymphocytes # (Manual)   


 


PT   


 


INR   


 


APTT   


 


D-Dimer   


 


POC ABG pH   


 


POC ABG pCO2   


 


POC ABG pO2   


 


VBG pH   


 


Sodium   


 


Potassium   


 


Chloride   


 


Carbon Dioxide   


 


BUN   


 


Creatinine   


 


Glucose   


 


POC Glucose   


 


Lactic Acid   4.70 H* 


 


Calcium   


 


AST   


 


ALT   


 


Total Creatine Kinase   


 


CK-MB (CK-2)   


 


Troponin T  0.275 H* D   0.858 H* D


 


Total Protein   


 


Albumin   














  03/11/18 03/11/18 03/12/18





  21:55 22:20 01:25


 


MCV   


 


Lymph % (Auto)   


 


Eos % (Auto)   


 


Lymph #   


 


Eos #   


 


Seg Neutrophils %   


 


Seg Neuts % (Manual)   


 


Lymphocytes % (Manual)   


 


Lymphocytes # (Manual)   


 


PT   


 


INR   


 


APTT   


 


D-Dimer   


 


POC ABG pH   


 


POC ABG pCO2    50.0 H


 


POC ABG pO2   


 


VBG pH   


 


Sodium   


 


Potassium   


 


Chloride   


 


Carbon Dioxide   


 


BUN   


 


Creatinine   


 


Glucose   


 


POC Glucose   166 H 


 


Lactic Acid  4.40 H*  


 


Calcium   


 


AST   


 


ALT   


 


Total Creatine Kinase   


 


CK-MB (CK-2)   


 


Troponin T   


 


Total Protein   


 


Albumin   














  03/12/18 03/12/18 03/12/18





  05:33 06:15 06:15


 


MCV   


 


Lymph % (Auto)   


 


Eos % (Auto)   


 


Lymph #   


 


Eos #   


 


Seg Neutrophils %   


 


Seg Neuts % (Manual)   


 


Lymphocytes % (Manual)   


 


Lymphocytes # (Manual)   


 


PT   


 


INR   


 


APTT   


 


D-Dimer   


 


POC ABG pH  7.488 H  


 


POC ABG pCO2  33.1 L  


 


POC ABG pO2  173 H  


 


VBG pH   


 


Sodium   154 H D 


 


Potassium   3.5 L 


 


Chloride   113.7 H 


 


Carbon Dioxide   


 


BUN   26 H 


 


Creatinine   1.6 H 


 


Glucose   204 H 


 


POC Glucose   


 


Lactic Acid    2.70 H*


 


Calcium   7.8 L 


 


AST   206 H 


 


ALT   217 H 


 


Total Creatine Kinase   


 


CK-MB (CK-2)   


 


Troponin T   


 


Total Protein   5.9 L 


 


Albumin   3.3 L 














  03/12/18 03/12/18 03/13/18





  17:49 17:49 04:11


 


MCV   


 


Lymph % (Auto)   


 


Eos % (Auto)   


 


Lymph #   


 


Eos #   


 


Seg Neutrophils %   


 


Seg Neuts % (Manual)   


 


Lymphocytes % (Manual)   


 


Lymphocytes # (Manual)   


 


PT   16.8 H 


 


INR   1.29 H 


 


APTT   43.9 H 


 


D-Dimer   


 


POC ABG pH   


 


POC ABG pCO2   


 


POC ABG pO2    137 H


 


VBG pH   


 


Sodium   


 


Potassium   


 


Chloride   


 


Carbon Dioxide   


 


BUN   


 


Creatinine   


 


Glucose   


 


POC Glucose   


 


Lactic Acid   


 


Calcium   


 


AST   


 


ALT   


 


Total Creatine Kinase  2322 H  


 


CK-MB (CK-2)  10.9 H  


 


Troponin T  0.231 H* D  


 


Total Protein   


 


Albumin   














  03/13/18 03/13/18 03/14/18





  13:12 23:28 00:20


 


MCV   


 


Lymph % (Auto)    10.8 L


 


Eos % (Auto)    8.4 H


 


Lymph #    1.0 L


 


Eos #    0.8 H


 


Seg Neutrophils %    73.7 H


 


Seg Neuts % (Manual)   


 


Lymphocytes % (Manual)   


 


Lymphocytes # (Manual)   


 


PT   


 


INR   


 


APTT   


 


D-Dimer   


 


POC ABG pH   


 


POC ABG pCO2   


 


POC ABG pO2   


 


VBG pH   


 


Sodium  148 H  


 


Potassium  3.4 L  


 


Chloride  112.0 H  


 


Carbon Dioxide   


 


BUN  23 H  


 


Creatinine  1.3 H  


 


Glucose  153 H  


 


POC Glucose   170 H 


 


Lactic Acid   


 


Calcium  7.8 L  


 


AST  103 H  


 


ALT  145 H  


 


Total Creatine Kinase   


 


CK-MB (CK-2)   


 


Troponin T   


 


Total Protein  5.6 L  


 


Albumin  2.8 L  














  03/14/18 03/14/18 03/14/18





  00:20 04:04 05:05


 


MCV   


 


Lymph % (Auto)   


 


Eos % (Auto)   


 


Lymph #   


 


Eos #   


 


Seg Neutrophils %   


 


Seg Neuts % (Manual)   


 


Lymphocytes % (Manual)   


 


Lymphocytes # (Manual)   


 


PT   


 


INR   


 


APTT   


 


D-Dimer   


 


POC ABG pH   7.345 L 


 


POC ABG pCO2   


 


POC ABG pO2   


 


VBG pH   


 


Sodium  147 H   147 H


 


Potassium  3.5 L   3.5 L


 


Chloride  113.2 H   114.0 H


 


Carbon Dioxide  21 L   20 L


 


BUN  19 H   18 H


 


Creatinine   


 


Glucose  166 H   175 H


 


POC Glucose   


 


Lactic Acid   


 


Calcium  8.0 L   8.3 L


 


AST  78 H   72 H


 


ALT  138 H   130 H


 


Total Creatine Kinase   


 


CK-MB (CK-2)   


 


Troponin T   


 


Total Protein  5.1 L   5.6 L


 


Albumin  2.9 L   2.8 L














  03/14/18





  05:46


 


MCV 


 


Lymph % (Auto) 


 


Eos % (Auto) 


 


Lymph # 


 


Eos # 


 


Seg Neutrophils % 


 


Seg Neuts % (Manual) 


 


Lymphocytes % (Manual) 


 


Lymphocytes # (Manual) 


 


PT 


 


INR 


 


APTT 


 


D-Dimer 


 


POC ABG pH 


 


POC ABG pCO2 


 


POC ABG pO2 


 


VBG pH 


 


Sodium 


 


Potassium 


 


Chloride 


 


Carbon Dioxide 


 


BUN 


 


Creatinine 


 


Glucose 


 


POC Glucose  190 H


 


Lactic Acid 


 


Calcium 


 


AST 


 


ALT 


 


Total Creatine Kinase 


 


CK-MB (CK-2) 


 


Troponin T 


 


Total Protein 


 


Albumin

## 2018-03-14 NOTE — PROGRESS NOTE
Assessment and Plan


Assessment:


S/p PEA arrest 


NSTEMI type I 


Abnormal ECG


Acute respiratory failure - intubated


PNA - ? aspiration


AMS - head CT c/w diffuse anoxic injury


Metabolic acidosis  


Hypernatremia


Hypotension - requiring vasopressor suppport


Elevated DDimer - chest CTA negative for PE


H/o asthma





Plan:


Pt declared brain dead per neurology. Family to withdraw care at 5PM today per 

primary RN. 


D/c heparin gtt. 


Continue supportive management. 





The patient has been seen in conjunction with Dr. Spicer who agrees with the 

assessment and plan of care.








Subjective


Date of service: 03/14/18


Principal diagnosis: Anoxic brain injury, brain death.


Interval history: 


pt remains intubated, off sedation, nonresponsive, on vasopressors. Family at 

bedside. 








Objective





  Last Vital Signs











Temp  97.4 F L  03/14/18 11:54


 


Pulse  79   03/14/18 11:45


 


Resp  16   03/14/18 10:05


 


BP  112/71   03/14/18 11:45


 


Pulse Ox  97   03/14/18 11:45




















- Physical Examination


General: Other (intubated, nonresponsive)


HEENT: Positive: Other (fixed, dilated)


Neck: Positive: neck supple, trachea midline


Cardiac: Positive: Reg Rate and Rhythm, S1/S2


Lungs: Positive: Decreased Breath Sounds


Neuro: Positive: Other (unresponsive)


Skin: Positive: Clear.  Negative: Rash, Wound


Musculoskeletal: No Fluid Collection, No Pain, Normal Range of Motion


Extremities: Absent: edema





- Labs and Meds


 Cardiac Enzymes











  03/13/18 03/14/18 03/14/18 Range/Units





  13:12 00:20 05:05 


 


AST  103 H  78 H  72 H  (5-40)  units/L








 CBC











  03/14/18 03/14/18 Range/Units





  00:20 05:05 


 


WBC  9.0  9.2  (4.5-11.0)  K/mm3


 


RBC  3.79  3.69  (3.65-5.03)  M/mm3


 


Hgb  11.6  11.3  (10.1-14.3)  gm/dl


 


Hct  34.5  34.1  (30.3-42.9)  %


 


Plt Count  141  150  (140-440)  K/mm3


 


Lymph #  1.0 L   (1.2-5.4)  K/mm3


 


Mono #  0.6   (0.0-0.8)  K/mm3


 


Eos #  0.8 H   (0.0-0.4)  K/mm3


 


Baso #  0.0   (0.0-0.1)  K/mm3








 Comprehensive Metabolic Panel











  03/13/18 03/14/18 03/14/18 Range/Units





  13:12 00:20 05:05 


 


Sodium  148 H  147 H  147 H  (137-145)  mmol/L


 


Potassium  3.4 L  3.5 L  3.5 L  (3.6-5.0)  mmol/L


 


Chloride  112.0 H  113.2 H  114.0 H  ()  mmol/L


 


Carbon Dioxide  23  21 L  20 L  (22-30)  mmol/L


 


BUN  23 H  19 H  18 H  (7-17)  mg/dL


 


Creatinine  1.3 H  1.1  1.2  (0.7-1.2)  mg/dL


 


Glucose  153 H  166 H  175 H  ()  mg/dL


 


Calcium  7.8 L  8.0 L  8.3 L  (8.4-10.2)  mg/dL


 


AST  103 H  78 H  72 H  (5-40)  units/L


 


ALT  145 H  138 H  130 H  (7-56)  units/L


 


Alkaline Phosphatase  82  93  92  ()  units/L


 


Total Protein  5.6 L  5.1 L  5.6 L  (6.3-8.2)  g/dL


 


Albumin  2.8 L  2.9 L  2.8 L  (3.9-5)  g/dL














- Imaging and Cardiology


EKG: report reviewed, image reviewed


Echo: report reviewed (60-65%, mild TR)





- Telemetry


EKG Rhythm: Sinus Rhythm





- EKG


Sinus rhythms and dysrhythmias: sinus rhythm


Repolarization changes or abnormalities: ST or T wave suggestive of ischemia (

diffuse)